# Patient Record
Sex: MALE | Race: WHITE | Employment: UNEMPLOYED | URBAN - METROPOLITAN AREA
[De-identification: names, ages, dates, MRNs, and addresses within clinical notes are randomized per-mention and may not be internally consistent; named-entity substitution may affect disease eponyms.]

---

## 2017-01-09 RX ORDER — AZATHIOPRINE 50 MG/1
TABLET ORAL
Qty: 150 TABLET | Refills: 5 | Status: SHIPPED | OUTPATIENT
Start: 2017-01-09 | End: 2017-06-29

## 2017-01-09 RX ORDER — AZATHIOPRINE 50 MG/1
TABLET ORAL
Qty: 150 TABLET | Refills: 5 | OUTPATIENT
Start: 2017-01-09

## 2017-01-09 NOTE — TELEPHONE ENCOUNTER
From: Thomas Courser  To:  Austin Crum MD  Sent: 1/6/2017 7:35 PM CST  Subject: Medication Renewal Request    Original authorizing provider: MD Thomas Flood Courser would like a refill of the following medications:  azathioprine

## 2017-01-09 NOTE — TELEPHONE ENCOUNTER
Pending Prescriptions Disp Refills    AZATHIOPRINE 48 MG Oral Tab [Pharmacy Med Name: AZATHIOPRINE 50 MG TABLET] 150 tablet 5     Sig: TAKE FIVE TABLETS BY MOUTH DAILY         See refill request  Patient last seen 6-20-16.    Medication last refilled 6-20

## 2017-01-09 NOTE — TELEPHONE ENCOUNTER
Pending Prescriptions Disp Refills    azathioprine 50 MG Oral Tab 150 tablet 5     Sig: TAKE FIVE TABLETS BY MOUTH DAILY         See refill request  Patient last seen 6-20-16.    Medication last refilled 6-20-16

## 2017-03-22 LAB
ABSOLUTE BASOPHILS: 35 CELLS/UL (ref 0–200)
ABSOLUTE EOSINOPHILS: 66 CELLS/UL (ref 15–500)
ABSOLUTE LYMPHOCYTES: 774 CELLS/UL (ref 850–3900)
ABSOLUTE MONOCYTES: 260 CELLS/UL (ref 200–950)
ABSOLUTE NEUTROPHILS: 3265 CELLS/UL (ref 1500–7800)
ALBUMIN/GLOBULIN RATIO: 1.3 (CALC) (ref 1–2.5)
ALBUMIN: 4.3 G/DL (ref 3.6–5.1)
ALKALINE PHOSPHATASE: 52 U/L (ref 40–115)
ALT: 15 U/L (ref 9–46)
AST: 19 U/L (ref 10–35)
BASOPHILS: 0.8 %
BILIRUBIN, DIRECT: 0.1 MG/DL
BILIRUBIN, INDIRECT: 0.5 MG/DL (CALC) (ref 0.2–1.2)
BILIRUBIN, TOTAL: 0.6 MG/DL (ref 0.2–1.2)
EOSINOPHILS: 1.5 %
GLOBULIN: 3.2 G/DL (CALC) (ref 1.9–3.7)
HEMATOCRIT: 45.6 % (ref 38.5–50)
HEMOGLOBIN: 15.3 G/DL (ref 13.2–17.1)
LYMPHOCYTES: 17.6 %
MCH: 30 PG (ref 27–33)
MCHC: 33.5 G/DL (ref 32–36)
MCV: 89.6 FL (ref 80–100)
MONOCYTES: 5.9 %
MPV: 8.9 FL (ref 7.5–12.5)
NEUTROPHILS: 74.2 %
PLATELET COUNT: 196 THOUSAND/UL (ref 140–400)
PROTEIN, TOTAL: 7.5 G/DL (ref 6.1–8.1)
RDW: 14 % (ref 11–15)
RED BLOOD CELL COUNT: 5.09 MILLION/UL (ref 4.2–5.8)
WHITE BLOOD CELL COUNT: 4.4 THOUSAND/UL (ref 3.8–10.8)

## 2017-03-24 ENCOUNTER — OFFICE VISIT (OUTPATIENT)
Dept: DERMATOLOGY CLINIC | Facility: CLINIC | Age: 52
End: 2017-03-24

## 2017-03-24 DIAGNOSIS — D22.9 MULTIPLE NEVI: ICD-10-CM

## 2017-03-24 DIAGNOSIS — D23.4 BENIGN NEOPLASM OF SCALP AND SKIN OF NECK: ICD-10-CM

## 2017-03-24 DIAGNOSIS — D23.5 BENIGN NEOPLASM OF SKIN OF TRUNK, EXCEPT SCROTUM: ICD-10-CM

## 2017-03-24 DIAGNOSIS — D23.60 BENIGN NEOPLASM OF SKIN OF UPPER LIMB, INCLUDING SHOULDER, UNSPECIFIED LATERALITY: ICD-10-CM

## 2017-03-24 DIAGNOSIS — L82.1 SEBORRHEIC KERATOSES: ICD-10-CM

## 2017-03-24 DIAGNOSIS — L72.0 EPIDERMAL CYST: Primary | ICD-10-CM

## 2017-03-24 PROCEDURE — 99201 OFFICE/OUTPT VISIT,NEW,LEVL I: CPT | Performed by: DERMATOLOGY

## 2017-03-24 PROCEDURE — 99212 OFFICE O/P EST SF 10 MIN: CPT | Performed by: DERMATOLOGY

## 2017-04-10 NOTE — PROGRESS NOTES
Herman Garner is a 45year old male. HPI:     CC:  Patient presents with:  Full Skin Exam: New pt presenting for full body skin exam. Pt is currerntly taking azathioprine for treatment of crohn's disease. Pt denies personal and family hx of skin cancer. flexure   • Status post small bowel resection 2013   • Status post cystoscopy    • History of kidney surgery      per NG (ECD) : \"kidney stent removal\"   • Lipid screening 12-         Past Surgical History    COLECTOMY  2013    APPENDECTOMY oriented in no acute distress. Exam total-body performed, including scalp, head, neck, face,nails, hair, external eyes, including conjunctival mucosa, eyelids, lips external ears, back, chest,/ breasts, axillae,  abdomen, arms, legs, palms.      Multiple l of melanoma discussed with patient. Sunscreen use, sun protection, self exams reviewed. Followup as noted RTC routine checkup 6 mos - one year or p.r.n.

## 2017-06-30 RX ORDER — AZATHIOPRINE 50 MG/1
TABLET ORAL
Qty: 150 TABLET | Refills: 5 | Status: SHIPPED
Start: 2017-06-30 | End: 2017-12-15

## 2017-06-30 NOTE — TELEPHONE ENCOUNTER
Last seen 6/20/16, last refilled 1/9/17.  Please advise    Future Appointments  Date Time Provider Ketan Cervantes   7/26/2017 2:00 PM Tyron Gerard MD [facility] Narda HEBERT

## 2017-06-30 NOTE — TELEPHONE ENCOUNTER
From: Shen Mckenna  Sent: 6/29/2017 10:50 AM CDT  Subject: Medication Renewal Request    Shen Mckenna would like a refill of the following medications:  azathioprine 50 MG Oral Tab Claus Chisholm MD]    Preferred pharmacy: [facility] [id number] [facility] [employer] -

## 2017-07-18 ENCOUNTER — PATIENT MESSAGE (OUTPATIENT)
Dept: GASTROENTEROLOGY | Facility: CLINIC | Age: 52
End: 2017-07-18

## 2017-07-18 DIAGNOSIS — K50.00 CROHN'S DISEASE OF SMALL INTESTINE WITHOUT COMPLICATION (HCC): Primary | ICD-10-CM

## 2017-07-18 NOTE — TELEPHONE ENCOUNTER
From: Shen Mckenna  To: Alverto Negro MD  Sent: 7/18/2017 2:17 PM CDT  Subject: Non-Urgent Medical Question    hello,i need to have my bloodwork order updated. [facility] in [place].  would like to get my blood checked by weeks e

## 2017-07-21 LAB
ABSOLUTE BASOPHILS: 28 CELLS/UL (ref 0–200)
ABSOLUTE EOSINOPHILS: 129 CELLS/UL (ref 15–500)
ABSOLUTE LYMPHOCYTES: 918 CELLS/UL (ref 850–3900)
ABSOLUTE MONOCYTES: 521 CELLS/UL (ref 200–950)
ABSOLUTE NEUTROPHILS: 4004 CELLS/UL (ref 1500–7800)
ALBUMIN/GLOBULIN RATIO: 1.5 (CALC) (ref 1–2.5)
ALBUMIN: 4.4 G/DL (ref 3.6–5.1)
ALKALINE PHOSPHATASE: 48 U/L (ref 40–115)
ALT: 24 U/L (ref 9–46)
AST: 28 U/L (ref 10–35)
BASOPHILS: 0.5 %
BILIRUBIN, TOTAL: 0.8 MG/DL (ref 0.2–1.2)
BUN: 12 MG/DL (ref 7–25)
CALCIUM: 9.6 MG/DL (ref 8.6–10.3)
CARBON DIOXIDE: 31 MMOL/L (ref 20–31)
CHLORIDE: 102 MMOL/L (ref 98–110)
CREATININE: 1.22 MG/DL (ref 0.7–1.33)
EGFR IF AFRICN AM: 79 ML/MIN/1.73M2
EGFR IF NONAFRICN AM: 68 ML/MIN/1.73M2
EOSINOPHILS: 2.3 %
GLOBULIN: 3 G/DL (CALC) (ref 1.9–3.7)
GLUCOSE: 105 MG/DL (ref 65–99)
HEMATOCRIT: 44.6 % (ref 38.5–50)
HEMOGLOBIN: 15.6 G/DL (ref 13.2–17.1)
LYMPHOCYTES: 16.4 %
MCH: 30.6 PG (ref 27–33)
MCHC: 35 G/DL (ref 32–36)
MCV: 87.6 FL (ref 80–100)
MONOCYTES: 9.3 %
MPV: 11.1 FL (ref 7.5–12.5)
NEUTROPHILS: 71.5 %
PLATELET COUNT: 170 THOUSAND/UL (ref 140–400)
POTASSIUM: 3.6 MMOL/L (ref 3.5–5.3)
PROTEIN, TOTAL: 7.4 G/DL (ref 6.1–8.1)
RDW: 15.9 % (ref 11–15)
RED BLOOD CELL COUNT: 5.09 MILLION/UL (ref 4.2–5.8)
SODIUM: 140 MMOL/L (ref 135–146)
WHITE BLOOD CELL COUNT: 5.6 THOUSAND/UL (ref 3.8–10.8)

## 2017-07-26 ENCOUNTER — OFFICE VISIT (OUTPATIENT)
Dept: GASTROENTEROLOGY | Facility: CLINIC | Age: 52
End: 2017-07-26

## 2017-07-26 VITALS
SYSTOLIC BLOOD PRESSURE: 121 MMHG | HEIGHT: 72.75 IN | DIASTOLIC BLOOD PRESSURE: 77 MMHG | HEART RATE: 71 BPM | WEIGHT: 256.19 LBS | BODY MASS INDEX: 33.95 KG/M2

## 2017-07-26 DIAGNOSIS — K50.80 CROHN'S DISEASE OF BOTH SMALL AND LARGE INTESTINE WITHOUT COMPLICATION (HCC): Primary | ICD-10-CM

## 2017-07-26 PROCEDURE — 99212 OFFICE O/P EST SF 10 MIN: CPT | Performed by: INTERNAL MEDICINE

## 2017-07-26 PROCEDURE — 99213 OFFICE O/P EST LOW 20 MIN: CPT | Performed by: INTERNAL MEDICINE

## 2017-07-26 NOTE — PROGRESS NOTES
HPI:    Patient ID: Edgardo Romero is a 45year old male. HPI  Mohan Cotto returns in follow-up.  He was last seen in June 2016.  As per previous notes he has a history of Crohn's disease dating back approximately 17 years (2000).  He developed a psoas abscess stools. He has noted no bleeding. The patient denies fevers, chills or sweats. Sveta Jones is currently taking 200 mg daily of azathioprine. Laboratory testing has been favorable with a therapeutic 6-TG level.     Sevta Jones has also followed up with dermato note and vitals reviewed.       Component      Latest Ref Rng & Units 7/20/2017   WHITE BLOOD CELL COUNT      3.8 - 10.8 Thousand/uL 5.6   RED BLOOD CELL COUNT      4.20 - 5.80 Million/uL 5.09   Hemoglobin      13.2 - 17.1 g/dL 15.6   Hematocrit      38.5 - acceptable. I am recommending that we continue current therapy. The patient is not interested in escalating therapy hence I would not proceed with a colonoscopy at this juncture. The patient will continue laboratory testing every 3 months.   Yearly derma

## 2017-07-27 NOTE — PATIENT INSTRUCTIONS
1.  Continue current medication. 2.  Blood work every 3 months. 3.  Please see the dermatologist if the spot on your nose does not go away within a few weeks. 4.  Make an appointment with the eye doctor.   5.  Follow-up office visit in 1 year or sooner i

## 2017-08-31 RX ORDER — LISINOPRIL AND HYDROCHLOROTHIAZIDE 25; 20 MG/1; MG/1
1 TABLET ORAL
Qty: 90 TABLET | Refills: 3 | OUTPATIENT
Start: 2017-08-31

## 2017-09-01 RX ORDER — LISINOPRIL AND HYDROCHLOROTHIAZIDE 25; 20 MG/1; MG/1
1 TABLET ORAL
Qty: 90 TABLET | Refills: 0 | Status: SHIPPED | OUTPATIENT
Start: 2017-09-01 | End: 2017-12-26

## 2017-09-01 NOTE — TELEPHONE ENCOUNTER
Failed protocol not seen in over 1 year.    Hypertensive Medications  Protocol Criteria:  · Appointment scheduled in the past 6 months or in the next 3 months  · BMP or CMP in the past 12 months  · Creatinine result < 2  Recent Outpatient Visits

## 2017-09-01 NOTE — TELEPHONE ENCOUNTER
Failed protocol not seen in 1 year.      Hypertensive Medications  Protocol Criteria:  · Appointment scheduled in the past 6 months or in the next 3 months  · BMP or CMP in the past 12 months  · Creatinine result < 2  Recent Outpatient Visits            1 m

## 2017-09-01 NOTE — TELEPHONE ENCOUNTER
Message noted: Chart reviewed and may refill medication as requested times one. Prescription sent to listed pharmacy.  Please notify patient to make follow up appointment for further refills

## 2017-12-15 RX ORDER — AZATHIOPRINE 50 MG/1
TABLET ORAL
Qty: 150 TABLET | Refills: 5 | Status: SHIPPED | OUTPATIENT
Start: 2017-12-15 | End: 2018-06-18

## 2017-12-15 NOTE — TELEPHONE ENCOUNTER
Pending Prescriptions Disp Refills    AZATHIOPRINE 50 MG Oral Tab [Pharmacy Med Name: AZATHIOPRINE 50 MG TABLET] 150 tablet 5     Sig: TAKE FIVE TABLETS BY MOUTH DAILY         LOV 7/26/17  LR  6/30/17    em

## 2017-12-26 ENCOUNTER — TELEPHONE (OUTPATIENT)
Dept: INTERNAL MEDICINE CLINIC | Facility: CLINIC | Age: 52
End: 2017-12-26

## 2017-12-26 RX ORDER — LISINOPRIL AND HYDROCHLOROTHIAZIDE 25; 20 MG/1; MG/1
TABLET ORAL
Qty: 30 TABLET | Refills: 0 | Status: SHIPPED | OUTPATIENT
Start: 2017-12-26 | End: 2018-01-02

## 2017-12-26 NOTE — TELEPHONE ENCOUNTER
Refilled per protocol for #30 until appointment on 1/2/18 with Dr. Barbara Boone      Hypertensive Medications  Protocol Criteria:  · Appointment scheduled in the past 6 months or in the next 3 months  · BMP or CMP in the past 12 months  · Creatinine result < 2  R

## 2017-12-26 NOTE — TELEPHONE ENCOUNTER
Pt contacted to obtain insurance info for his appt on 1/2/2018 with Dr. Lynda Gay, but he does not have any. Pt coming in as self pay.   Pt asking since he does have a scheduled appt to come in on 1/2, he is asking if he can get a refill on medication below to

## 2017-12-27 RX ORDER — LISINOPRIL AND HYDROCHLOROTHIAZIDE 25; 20 MG/1; MG/1
TABLET ORAL
Qty: 90 TABLET | Refills: 0 | Status: SHIPPED | OUTPATIENT
Start: 2017-12-27 | End: 2018-01-02

## 2017-12-27 NOTE — TELEPHONE ENCOUNTER
Message noted: Chart reviewed and may refill medication as requested times one. Prescription sent to listed pharmacy. Please notify patient to make follow up appointment for further refills. Former pt of Dr Saurabh Foley.

## 2018-01-02 ENCOUNTER — OFFICE VISIT (OUTPATIENT)
Dept: INTERNAL MEDICINE CLINIC | Facility: CLINIC | Age: 53
End: 2018-01-02

## 2018-01-02 VITALS
TEMPERATURE: 97 F | BODY MASS INDEX: 36.16 KG/M2 | HEIGHT: 72 IN | HEART RATE: 68 BPM | SYSTOLIC BLOOD PRESSURE: 117 MMHG | DIASTOLIC BLOOD PRESSURE: 74 MMHG | WEIGHT: 267 LBS

## 2018-01-02 DIAGNOSIS — I10 ESSENTIAL HYPERTENSION: Primary | ICD-10-CM

## 2018-01-02 PROCEDURE — 99212 OFFICE O/P EST SF 10 MIN: CPT | Performed by: INTERNAL MEDICINE

## 2018-01-02 PROCEDURE — 99203 OFFICE O/P NEW LOW 30 MIN: CPT | Performed by: INTERNAL MEDICINE

## 2018-01-02 RX ORDER — LISINOPRIL AND HYDROCHLOROTHIAZIDE 25; 20 MG/1; MG/1
1 TABLET ORAL
Qty: 90 TABLET | Refills: 1 | Status: SHIPPED | OUTPATIENT
Start: 2018-01-02 | End: 2018-09-14

## 2018-01-02 NOTE — PROGRESS NOTES
Patient ID: Tony Villarreal is a 47year old male. Patient presents with:  Establish Care  Medication Request       HISTORY OF PRESENT ILLNESS:   HPI  Patient presents for above. Here to establish care after prior physician retired.   History of hypertens Single  Spouse name: N/A    Years of education: N/A  Number of children: N/A     Occupational History  None on file     Social History Main Topics   Smoking status: Former Smoker     Quit date: 8/21/2000    Smokeless tobacco: Former User    Comment: Vapor

## 2018-01-02 NOTE — PATIENT INSTRUCTIONS
Taking Your Blood Pressure  Blood pressure is the force of blood against the artery wall as it moves from the heart through the blood vessels. You can take your own blood pressure reading using a digital monitor.  Take your readings the same each time, us · Place your arm on the table, palm up. Your arm should be at the level of your heart. Wrap the cuff around your upper arm, just above your elbow. It’s best done on bare skin, not over clothing.  Most cuffs will indicate where the brachial artery (the blood

## 2018-03-09 LAB
ABSOLUTE BASOPHILS: 41 CELLS/UL (ref 0–200)
ABSOLUTE EOSINOPHILS: 53 CELLS/UL (ref 15–500)
ABSOLUTE LYMPHOCYTES: 867 CELLS/UL (ref 850–3900)
ABSOLUTE MONOCYTES: 472 CELLS/UL (ref 200–950)
ABSOLUTE NEUTROPHILS: 4466 CELLS/UL (ref 1500–7800)
ALBUMIN/GLOBULIN RATIO: 1.6 (CALC) (ref 1–2.5)
ALBUMIN: 4.6 G/DL (ref 3.6–5.1)
ALKALINE PHOSPHATASE: 50 U/L (ref 40–115)
ALT: 34 U/L (ref 9–46)
AST: 46 U/L (ref 10–35)
BASOPHILS: 0.7 %
BILIRUBIN, DIRECT: 0.1 MG/DL
BILIRUBIN, INDIRECT: 0.6 MG/DL (CALC) (ref 0.2–1.2)
BILIRUBIN, TOTAL: 0.7 MG/DL (ref 0.2–1.2)
EOSINOPHILS: 0.9 %
GLOBULIN: 2.9 G/DL (CALC) (ref 1.9–3.7)
HEMATOCRIT: 48.5 % (ref 38.5–50)
HEMOGLOBIN: 16.4 G/DL (ref 13.2–17.1)
LYMPHOCYTES: 14.7 %
MCH: 29.8 PG (ref 27–33)
MCHC: 33.8 G/DL (ref 32–36)
MCV: 88 FL (ref 80–100)
MONOCYTES: 8 %
MPV: 11.4 FL (ref 7.5–12.5)
NEUTROPHILS: 75.7 %
PLATELET COUNT: 185 THOUSAND/UL (ref 140–400)
PROTEIN, TOTAL: 7.5 G/DL (ref 6.1–8.1)
RDW: 14.8 % (ref 11–15)
RED BLOOD CELL COUNT: 5.51 MILLION/UL (ref 4.2–5.8)
WHITE BLOOD CELL COUNT: 5.9 THOUSAND/UL (ref 3.8–10.8)

## 2018-04-20 ENCOUNTER — OFFICE VISIT (OUTPATIENT)
Dept: INTERNAL MEDICINE CLINIC | Facility: CLINIC | Age: 53
End: 2018-04-20

## 2018-04-20 VITALS
BODY MASS INDEX: 35.89 KG/M2 | HEART RATE: 61 BPM | DIASTOLIC BLOOD PRESSURE: 73 MMHG | TEMPERATURE: 98 F | WEIGHT: 265 LBS | HEIGHT: 72 IN | SYSTOLIC BLOOD PRESSURE: 117 MMHG

## 2018-04-20 DIAGNOSIS — Z00.00 ANNUAL PHYSICAL EXAM: Primary | ICD-10-CM

## 2018-04-20 DIAGNOSIS — K50.80 CROHN'S DISEASE OF BOTH SMALL AND LARGE INTESTINE WITHOUT COMPLICATION (HCC): ICD-10-CM

## 2018-04-20 DIAGNOSIS — I10 ESSENTIAL HYPERTENSION: ICD-10-CM

## 2018-04-20 DIAGNOSIS — E66.9 OBESITY (BMI 30.0-34.9): ICD-10-CM

## 2018-04-20 PROCEDURE — 99396 PREV VISIT EST AGE 40-64: CPT | Performed by: INTERNAL MEDICINE

## 2018-04-20 NOTE — PATIENT INSTRUCTIONS
Prevention Guidelines, Men Ages 48 to 59  Screening tests and vaccines are an important part of managing your health. Health counseling is essential, too. Below are guidelines for these, for men ages 48 to 59.  Talk with your healthcare provider to make s Prostate cancer Starting at age 39, talk to healthcare provider about risks and benefits of digital rectal exam (BLAKE) and prostate-specific antigen (PSA) screening1 At routine exams   Syphilis Men at increased risk for infection – talk with your healthcare pertussis (Td/Tdap) booster All men in this age group Td every 10 years, or a one-time dose of Tdap instead of a Td booster after age 25, then Td every 8 years   Zoster All men ages 61 and older 1 dose   Counseling Who needs it How often   Diet and exerci Foods did not cause your Crohn's, but they can affect it. Unfortunately, there is no one diet that works for everyone. Below are some things to try. Keep a food log to figure out what you are sensitive to.   · Eat more slowly and smaller amounts at a time, · Consider nutritional supplements. This is especially true if the diarrhea is prolonged, or you aren't eating or are losing weight.   Follow-up care  Follow up with your healthcare provider, or as advised. If a stool sample was taken or cultures were done,

## 2018-04-21 NOTE — PROGRESS NOTES
Patient ID: Doris Ugarte is a 47year old male. Patient presents with:  Physical       HISTORY OF PRESENT ILLNESS:   HPI  Patient presents for above. Here for annual exam.  History of hypertension on medications. Well-controlled.   Denies any chest pa Tab, TAKE FIVE TABLETS BY MOUTH DAILY, Disp: 150 tablet, Rfl: 5    Allergies:  Penicillin G            Rash      Social History  Social History   Marital status: Single  Spouse name: N/A    Years of education: N/A  Number of children: N/A     Occupational exam  · CBC WITH DIFFERENTIAL WITH PLATELET  · COMP METABOLIC PANEL (14)  · LIPID PANEL  · TSH W REFLEX TO FREE T4  · PSA    2. Essential hypertension  · MICROALB/CREAT RATIO, RANDOM URINE  · Continue current therapy as blood pressure well controlled.     3

## 2018-06-05 ENCOUNTER — TELEPHONE (OUTPATIENT)
Dept: INTERNAL MEDICINE CLINIC | Facility: CLINIC | Age: 53
End: 2018-06-05

## 2018-06-18 ENCOUNTER — OFFICE VISIT (OUTPATIENT)
Dept: GASTROENTEROLOGY | Facility: CLINIC | Age: 53
End: 2018-06-18

## 2018-06-18 VITALS
HEIGHT: 72 IN | SYSTOLIC BLOOD PRESSURE: 127 MMHG | DIASTOLIC BLOOD PRESSURE: 76 MMHG | WEIGHT: 262.19 LBS | HEART RATE: 72 BPM | BODY MASS INDEX: 35.51 KG/M2

## 2018-06-18 DIAGNOSIS — K50.80 CROHN'S DISEASE OF BOTH SMALL AND LARGE INTESTINE WITHOUT COMPLICATION (HCC): Primary | ICD-10-CM

## 2018-06-18 PROCEDURE — 99212 OFFICE O/P EST SF 10 MIN: CPT | Performed by: INTERNAL MEDICINE

## 2018-06-18 PROCEDURE — 99213 OFFICE O/P EST LOW 20 MIN: CPT | Performed by: INTERNAL MEDICINE

## 2018-06-18 RX ORDER — AZATHIOPRINE 50 MG/1
250 TABLET ORAL DAILY
Qty: 450 TABLET | Refills: 1 | Status: SHIPPED | OUTPATIENT
Start: 2018-06-18 | End: 2018-12-10

## 2018-06-18 NOTE — PROGRESS NOTES
HPI:    Patient ID: Татьяна Naqvi is a 47year old male. HPI  Guyann Prader returns in follow-up.  He was last seen in July 2017.      As per previous notes he has a history of Crohn's disease dating back approximately 18 years (2000).  He developed a psoas abs loose stools and on a bad day he will have 5 stools. He has noted no bleeding. The patient denies fevers, chills, cough or shortness of breath. He is taking 250 mg daily of azathioprine and requests a refill.      He last saw Dr. Perfecto Beckman in March 20 BLOOD CELL COUNT      4.20 - 5.80 Million/uL 5.34   Hemoglobin      13.2 - 17.1 g/dL 16.1   Hematocrit      38.5 - 50.0 % 46.9   MCV      80.0 - 100.0 fL 87.8   MCH      27.0 - 33.0 pg 30.1   MCHC      32.0 - 36.0 g/dL 34.3   RDW      11.0 - 15.0 % 14.6 obtain laboratory testing every 3 months. He should see Dr. Eli Melendez at least yearly. He will make an appointment to do so. Sveta Jones is due for a screening colonoscopy next year and was apprised of such. He will contact me with any changes.       Meds This V

## 2018-06-18 NOTE — PATIENT INSTRUCTIONS
1.  Continue current dose of azathioprine. 2.  Please make an appointment to see Dr. Kareem Ceballos from dermatology. 3.  Continue to obtain blood work every 3 months. 4.  You are due for colonoscopy next year. 5.  Please contact me sooner with any changes.

## 2018-09-14 DIAGNOSIS — I10 ESSENTIAL HYPERTENSION: ICD-10-CM

## 2018-09-14 RX ORDER — LISINOPRIL AND HYDROCHLOROTHIAZIDE 25; 20 MG/1; MG/1
TABLET ORAL
Qty: 90 TABLET | Refills: 0 | OUTPATIENT
Start: 2018-09-14

## 2018-09-14 RX ORDER — LISINOPRIL AND HYDROCHLOROTHIAZIDE 25; 20 MG/1; MG/1
1 TABLET ORAL
Qty: 90 TABLET | Refills: 1 | Status: SHIPPED | OUTPATIENT
Start: 2018-09-14 | End: 2019-03-01

## 2018-09-14 RX ORDER — LISINOPRIL AND HYDROCHLOROTHIAZIDE 25; 20 MG/1; MG/1
TABLET ORAL
Qty: 30 TABLET | Refills: 0 | OUTPATIENT
Start: 2018-09-14

## 2018-09-27 ENCOUNTER — TELEPHONE (OUTPATIENT)
Dept: GASTROENTEROLOGY | Facility: CLINIC | Age: 53
End: 2018-09-27

## 2018-09-27 NOTE — TELEPHONE ENCOUNTER
Edelmira/Kenneth Diag calling for mutual pt, indicates pt is there now and asking if [facility] lab standing orders can be faxed asap at:Fax#[phone] / Attn: Edelmira/Kenneth, thanks.   *Pt there now

## 2018-09-27 NOTE — TELEPHONE ENCOUNTER
Printed orders and faxed to [facility]#[phone] / Ilana Levine: Edelmira/Kenneth.  Fax confirmed

## 2018-09-28 LAB
ABSOLUTE BASOPHILS: 41 CELLS/UL (ref 0–200)
ABSOLUTE EOSINOPHILS: 82 CELLS/UL (ref 15–500)
ABSOLUTE LYMPHOCYTES: 904 CELLS/UL (ref 850–3900)
ABSOLUTE MONOCYTES: 462 CELLS/UL (ref 200–950)
ABSOLUTE NEUTROPHILS: 5311 CELLS/UL (ref 1500–7800)
ALBUMIN/GLOBULIN RATIO: 1.4 (CALC) (ref 1–2.5)
ALBUMIN: 4.3 G/DL (ref 3.6–5.1)
ALKALINE PHOSPHATASE: 52 U/L (ref 40–115)
ALT: 26 U/L (ref 9–46)
AST: 31 U/L (ref 10–35)
BASOPHILS: 0.6 %
BILIRUBIN, DIRECT: 0.1 MG/DL
BILIRUBIN, INDIRECT: 0.7 MG/DL (CALC) (ref 0.2–1.2)
BILIRUBIN, TOTAL: 0.8 MG/DL (ref 0.2–1.2)
EOSINOPHILS: 1.2 %
GLOBULIN: 3 G/DL (CALC) (ref 1.9–3.7)
HEMATOCRIT: 47.4 % (ref 38.5–50)
HEMOGLOBIN: 16.6 G/DL (ref 13.2–17.1)
LYMPHOCYTES: 13.3 %
MCH: 30.6 PG (ref 27–33)
MCHC: 35 G/DL (ref 32–36)
MCV: 87.3 FL (ref 80–100)
MONOCYTES: 6.8 %
MPV: 11 FL (ref 7.5–12.5)
NEUTROPHILS: 78.1 %
PLATELET COUNT: 155 THOUSAND/UL (ref 140–400)
PROTEIN, TOTAL: 7.3 G/DL (ref 6.1–8.1)
RDW: 14.7 % (ref 11–15)
RED BLOOD CELL COUNT: 5.43 MILLION/UL (ref 4.2–5.8)
WHITE BLOOD CELL COUNT: 6.8 THOUSAND/UL (ref 3.8–10.8)

## 2018-12-06 ENCOUNTER — PATIENT MESSAGE (OUTPATIENT)
Dept: GASTROENTEROLOGY | Facility: CLINIC | Age: 53
End: 2018-12-06

## 2018-12-07 NOTE — TELEPHONE ENCOUNTER
LOV 06/18/18, labs in September. Reminder message sent to pt to have 3 month blood draw this month.   Please advise on refill request.

## 2018-12-07 NOTE — TELEPHONE ENCOUNTER
From: Abbie Beasley  To: Karen Fitzpatrick MD  Sent: 12/6/2018 4:26 PM CST  Subject: Prescription Question    hello,i will need my perscription renewed for my chrohns pills. im still at [facility] in [place] by norma acldwell and [facility] javi. thanks

## 2018-12-10 RX ORDER — AZATHIOPRINE 50 MG/1
250 TABLET ORAL DAILY
Qty: 450 TABLET | Refills: 1 | Status: SHIPPED | OUTPATIENT
Start: 2018-12-10 | End: 2019-06-01

## 2018-12-10 NOTE — TELEPHONE ENCOUNTER
Pt returned call, relayed [facility] APRN message as shown below. Pt states is doing well and asymptomatic, but is requesting to be seen by Dr. Claudell Public and not by APRN.  Informed sooner appt available with Dayne Baltazar, but pt continued to want appt with

## 2018-12-10 NOTE — PROGRESS NOTES
Nursing: Prescription for azathioprine sent to pharmacy as Dr. Maki Suarez indicated refills x 1 year at the last office visit. Given that it has been 6 months since his last office visit I would recommend a routine office follow-up.   I concur with recom

## 2018-12-10 NOTE — TELEPHONE ENCOUNTER
[facility] [employer] [facility]. Jazlyn Duarte, APRN 1 hour ago (8:33 AM)        Nursing: Prescription for azathioprine sent to pharmacy as Dr. Susan Zhang indicated refills x 1 year at the last office visit.   Given that it has been 6 mon

## 2019-01-24 ENCOUNTER — PATIENT MESSAGE (OUTPATIENT)
Dept: GASTROENTEROLOGY | Facility: CLINIC | Age: 54
End: 2019-01-24

## 2019-01-25 NOTE — TELEPHONE ENCOUNTER
From: Cande Reyes  To: Ry An MD  Sent: 1/24/2019 6:41 PM CST  Subject: Non-Urgent Medical Question    hello dr Azam Manjarrez. i was wondering if it is neccessary for me to come in on 2-4 2019. Sujatha Spar Sujatha Spar Sujatha Spar i was told by a caller from the clinic it w

## 2019-01-27 NOTE — TELEPHONE ENCOUNTER
Please inform the patient that if he is feeling well, he may cancel his office appointment this February and plan/schedule a colonoscopy in June/August with a split dose SuPrep or Trilyte preparation and with IV sedation or MAC per scheduling.   Diagnosis:

## 2019-01-28 ENCOUNTER — TELEPHONE (OUTPATIENT)
Dept: GASTROENTEROLOGY | Facility: CLINIC | Age: 54
End: 2019-01-28

## 2019-01-28 DIAGNOSIS — K50.919 CROHN'S DISEASE WITH COMPLICATION, UNSPECIFIED GASTROINTESTINAL TRACT LOCATION (HCC): Primary | ICD-10-CM

## 2019-01-28 NOTE — TELEPHONE ENCOUNTER
Routed to GI Schedulers  GI schedulers- please schedule patient.  -------    Patient states he is feeling well and wants to schedule CLN and cancel OV appt. OV appt canceled and routed to schedulers.

## 2019-01-28 NOTE — TELEPHONE ENCOUNTER
Abril Campa MD 23 hours ago (3:59 PM)         Please inform the patient that if he is feeling well, he may cancel his office appointment this February and plan/schedule a colonoscopy in June/August with a split dose SuPrep or Trilyte preparation

## 2019-01-31 NOTE — TELEPHONE ENCOUNTER
Scheduled for:  Colonoscopy [address]  Provider Name: Dr. Merissa Jackson  Date:  7/23/19  Location:  [facility]  Sedation:  MAC  Time:   0730 (pt is aware to arrive at [address])   Prep:  Suprep, mailed 2/1/19  Meds/Allergies Reconciled?:  Physician reviewed   Diagnosis with codes:

## 2019-03-01 ENCOUNTER — OFFICE VISIT (OUTPATIENT)
Dept: INTERNAL MEDICINE CLINIC | Facility: CLINIC | Age: 54
End: 2019-03-01

## 2019-03-01 VITALS
RESPIRATION RATE: 22 BRPM | DIASTOLIC BLOOD PRESSURE: 82 MMHG | WEIGHT: 270 LBS | HEART RATE: 68 BPM | HEIGHT: 72 IN | BODY MASS INDEX: 36.57 KG/M2 | OXYGEN SATURATION: 99 % | TEMPERATURE: 98 F | SYSTOLIC BLOOD PRESSURE: 134 MMHG

## 2019-03-01 DIAGNOSIS — I10 ESSENTIAL HYPERTENSION: Primary | ICD-10-CM

## 2019-03-01 DIAGNOSIS — K50.10 CROHN'S DISEASE OF COLON WITHOUT COMPLICATION (HCC): ICD-10-CM

## 2019-03-01 DIAGNOSIS — E78.5 HYPERLIPIDEMIA LDL GOAL <100: ICD-10-CM

## 2019-03-01 DIAGNOSIS — E66.09 CLASS 2 OBESITY DUE TO EXCESS CALORIES WITHOUT SERIOUS COMORBIDITY WITH BODY MASS INDEX (BMI) OF 36.0 TO 36.9 IN ADULT: ICD-10-CM

## 2019-03-01 PROCEDURE — 99204 OFFICE O/P NEW MOD 45 MIN: CPT | Performed by: INTERNAL MEDICINE

## 2019-03-01 RX ORDER — LISINOPRIL AND HYDROCHLOROTHIAZIDE 25; 20 MG/1; MG/1
1 TABLET ORAL
Qty: 90 TABLET | Refills: 3 | Status: SHIPPED | OUTPATIENT
Start: 2019-03-01 | End: 2020-03-12

## 2019-03-01 NOTE — PROGRESS NOTES
HPI:    Patient ID: Abhinav Rios is a 46year old male. HPI   Patient is her to establish care . Chronic HTN  well controlled until  3 mos ago, BP was elevated to 180/95 per home monitor. Denies chest pain,, dizzy spells, NEVAREZ. He had gained 8 lbs.  Mi PREP KIT) 17.5-3.13-1.6 GM/177ML Oral Solution Take as directed. May use Lilian Scott or any other generic/subsitute leticia Disp: 1 Bottle Rfl: 0   azathioprine 50 MG Oral Tab Take 5 tablets (250 mg total) by mouth daily.  Disp: 450 tablet Rfl: 1     All without complication (HCC)  Azathioprine 250 mgs daily  Colonoscopy scheduled . Merary Gerard following.        Orders Placed This Encounter      LIPID PANEL [2320] [Q]      Meds This Visit:  Requested Prescriptions     Signed Prescriptions Disp Refills   •

## 2019-03-02 NOTE — PATIENT INSTRUCTIONS
Low-Salt Choices  Eating salt (sodium) can make your body retain too much water. Excess water makes your heart work harder. Canned, packaged, and frozen foods are easy to prepare. But they are often high in sodium.  Here are some ideas for low-salt foods Have you been told that your cholesterol is too high? If so, you could be heading for a heart attack, also known as acute myocardial infarction (AMI), or stroke. This is especially true if you have other risk factors for heart disease.  Get smart about chol · Do you exercise very little or not very often? Recommendations are for 30 minutes of exercise at least 5 days a week.  If you are not doing cardiovascular exercise as often as these recommendations, it may not be enough and you may be at higher risk for e If you have high cholesterol, you may need your cholesterol level tested more often to make sure your medicine and lifestyle changes are working to reduce your risks of having a heart attack or stroke.    Date Last Reviewed: 6/1/2016  © 3486-7537 [employer]

## 2019-03-19 LAB
CHOL/HDLC RATIO: 4.2 (CALC)
CHOLESTEROL, TOTAL: 133 MG/DL
HDL CHOLESTEROL: 32 MG/DL
LDL-CHOLESTEROL: 70 MG/DL (CALC)
NON-HDL CHOLESTEROL: 101 MG/DL (CALC)
TRIGLYCERIDES: 222 MG/DL

## 2019-03-20 LAB
ABSOLUTE BASOPHILS: 28 CELLS/UL (ref 0–200)
ABSOLUTE EOSINOPHILS: 42 CELLS/UL (ref 15–500)
ABSOLUTE LYMPHOCYTES: 945 CELLS/UL (ref 850–3900)
ABSOLUTE MONOCYTES: 539 CELLS/UL (ref 200–950)
ABSOLUTE NEUTROPHILS: 5446 CELLS/UL (ref 1500–7800)
ALBUMIN/GLOBULIN RATIO: 1.5 (CALC) (ref 1–2.5)
ALBUMIN: 4.5 G/DL (ref 3.6–5.1)
ALKALINE PHOSPHATASE: 51 U/L (ref 40–115)
ALT: 34 U/L (ref 9–46)
AST: 39 U/L (ref 10–35)
BASOPHILS: 0.4 %
BILIRUBIN, DIRECT: 0.1 MG/DL
BILIRUBIN, INDIRECT: 0.5 MG/DL (CALC) (ref 0.2–1.2)
BILIRUBIN, TOTAL: 0.6 MG/DL (ref 0.2–1.2)
EOSINOPHILS: 0.6 %
GLOBULIN: 3 G/DL (CALC) (ref 1.9–3.7)
HEMATOCRIT: 47.3 % (ref 38.5–50)
HEMOGLOBIN: 16.5 G/DL (ref 13.2–17.1)
LYMPHOCYTES: 13.5 %
MCH: 30.5 PG (ref 27–33)
MCHC: 34.9 G/DL (ref 32–36)
MCV: 87.4 FL (ref 80–100)
MONOCYTES: 7.7 %
MPV: 11.4 FL (ref 7.5–12.5)
NEUTROPHILS: 77.8 %
PLATELET COUNT: 187 THOUSAND/UL (ref 140–400)
PROTEIN, TOTAL: 7.5 G/DL (ref 6.1–8.1)
RDW: 14.8 % (ref 11–15)
RED BLOOD CELL COUNT: 5.41 MILLION/UL (ref 4.2–5.8)
WHITE BLOOD CELL COUNT: 7 THOUSAND/UL (ref 3.8–10.8)

## 2019-06-03 RX ORDER — AZATHIOPRINE 50 MG/1
250 TABLET ORAL DAILY
Qty: 450 TABLET | Refills: 1 | Status: SHIPPED | OUTPATIENT
Start: 2019-06-03 | End: 2019-12-10

## 2019-06-03 NOTE — TELEPHONE ENCOUNTER
Requested Prescriptions     Pending Prescriptions Disp Refills   • AZATHIOPRINE 50 MG Oral Tab [Pharmacy Med Name: AZATHIOPRINE 50 MG TABLET] 450 tablet 1     Sig: TAKE 5 TABLETS (250 MG TOTAL) BY MOUTH DAILY     LOV-6/18/18  LR-12/6/18

## 2019-06-03 NOTE — TELEPHONE ENCOUNTER
Patient's last office visit in 2018. He has an upcoming procedure with Dr. Susan Zhang. Up-to-date on labs every 3 months. Has been stable on azathioprine.

## 2019-07-02 ENCOUNTER — PATIENT MESSAGE (OUTPATIENT)
Dept: GASTROENTEROLOGY | Facility: CLINIC | Age: 54
End: 2019-07-02

## 2019-07-02 DIAGNOSIS — K50.80 CROHN'S DISEASE OF BOTH SMALL AND LARGE INTESTINE WITHOUT COMPLICATION (HCC): Primary | ICD-10-CM

## 2019-07-03 NOTE — TELEPHONE ENCOUNTER
From: Dia Nicely  To: Rasta Beckford MD  Sent: 7/2/2019 6:53 PM CDT  Subject: Non-Urgent Medical Question    hello,i will need to get my standing order updated with [facility]. i go to location in [facility].  also,could

## 2019-07-09 NOTE — TELEPHONE ENCOUNTER
Please inform the patient that a CMP and CBC will be ordered to be done now. The former test should be done once yearly. A CBC and hepatic function panel should be done every 3 months thereafter. I have entered the standing orders for these tests.

## 2019-07-23 ENCOUNTER — ANESTHESIA (OUTPATIENT)
Dept: ENDOSCOPY | Facility: HOSPITAL | Age: 54
End: 2019-07-23

## 2019-07-23 ENCOUNTER — HOSPITAL ENCOUNTER (OUTPATIENT)
Facility: HOSPITAL | Age: 54
Setting detail: HOSPITAL OUTPATIENT SURGERY
Discharge: HOME | End: 2019-07-23
Attending: INTERNAL MEDICINE | Admitting: INTERNAL MEDICINE

## 2019-07-23 ENCOUNTER — ANESTHESIA EVENT (OUTPATIENT)
Dept: ENDOSCOPY | Facility: HOSPITAL | Age: 54
End: 2019-07-23

## 2019-07-23 DIAGNOSIS — K50.919 CROHN'S DISEASE WITH COMPLICATION, UNSPECIFIED GASTROINTESTINAL TRACT LOCATION (HCC): ICD-10-CM

## 2019-07-23 PROCEDURE — 0DBN8ZX EXCISION OF SIGMOID COLON, VIA NATURAL OR ARTIFICIAL OPENING ENDOSCOPIC, DIAGNOSTIC: ICD-10-PCS | Performed by: INTERNAL MEDICINE

## 2019-07-23 PROCEDURE — 0DBB8ZX EXCISION OF ILEUM, VIA NATURAL OR ARTIFICIAL OPENING ENDOSCOPIC, DIAGNOSTIC: ICD-10-PCS | Performed by: INTERNAL MEDICINE

## 2019-07-23 PROCEDURE — 45385 COLONOSCOPY W/LESION REMOVAL: CPT | Performed by: INTERNAL MEDICINE

## 2019-07-23 PROCEDURE — 45380 COLONOSCOPY AND BIOPSY: CPT | Performed by: INTERNAL MEDICINE

## 2019-07-23 RX ORDER — LIDOCAINE HYDROCHLORIDE 10 MG/ML
INJECTION, SOLUTION EPIDURAL; INFILTRATION; INTRACAUDAL; PERINEURAL AS NEEDED
Status: DISCONTINUED | OUTPATIENT
Start: 2019-07-23 | End: 2019-07-23 | Stop reason: SURG

## 2019-07-23 RX ORDER — SODIUM CHLORIDE, SODIUM LACTATE, POTASSIUM CHLORIDE, CALCIUM CHLORIDE 600; 310; 30; 20 MG/100ML; MG/100ML; MG/100ML; MG/100ML
INJECTION, SOLUTION INTRAVENOUS CONTINUOUS
Status: DISCONTINUED | OUTPATIENT
Start: 2019-07-23 | End: 2019-07-23

## 2019-07-23 RX ADMIN — LIDOCAINE HYDROCHLORIDE 25 MG: 10 INJECTION, SOLUTION EPIDURAL; INFILTRATION; INTRACAUDAL; PERINEURAL at 07:28:00

## 2019-07-23 RX ADMIN — SODIUM CHLORIDE, SODIUM LACTATE, POTASSIUM CHLORIDE, CALCIUM CHLORIDE: 600; 310; 30; 20 INJECTION, SOLUTION INTRAVENOUS at 08:00:00

## 2019-07-23 RX ADMIN — SODIUM CHLORIDE, SODIUM LACTATE, POTASSIUM CHLORIDE, CALCIUM CHLORIDE: 600; 310; 30; 20 INJECTION, SOLUTION INTRAVENOUS at 07:23:00

## 2019-07-23 NOTE — ANESTHESIA PREPROCEDURE EVALUATION
Anesthesia PreOp Note    HPI:     Gregory Moreno is a 46year old male who presents for preoperative consultation requested by: Lamont Garcia MD    Date of Surgery: 7/23/2019    Procedure(s):  COLONOSCOPY  Indication: Crohn's disease with complicat Date Noted: 12/14/2007      Overweight         Date Noted: 05/03/2006      Chalazion         Date Noted: 05/11/2005        Past Medical History:   Diagnosis Date   • Crohn's disease ([address] 3/2000   • High blood pressure    • History of kidney surgery Highest education level: Not on file    Occupational History      Not on file    Social Needs      Financial resource strain: Not on file      Food insecurity:        Worry: Not on file        Inability: Not on file      Transportation needs:        Me Grew up on a farm: Not Asked        History of tanning: Not Asked        Outdoor occupation: Not Asked        Pt has a pacemaker: No        Pt has a defibrillator: No        Reaction to local anesthetic: No    Social History Narrative      Not on august

## 2019-07-23 NOTE — OPERATIVE REPORT
[facility] Endoscopy Report      Date of Procedure:  07/23/19      Preoperative Diagnosis:  Crohn's disease evaluate disease extent and activity      Postoperative Diagnosis:  1. Focal ileitis  2.   Widely patent ileoileal and colocolonic a superficial 6 mm ulceration a few centimeters proximal to the ileocecal valve. The ileum was slightly edematous and ringlike at this point.   It was difficult to ascertain whether this area was at or immediately distal to the patient's ileoileal anastomosi

## 2019-07-23 NOTE — ANESTHESIA POSTPROCEDURE EVALUATION
Patient: Domitila Navarrete    Procedure Summary     Date:  07/23/19 Room / Location:  [facility] ENDOSCOPY 01 / [facility] ENDOSCOPY    Anesthesia Start:  5859 Anesthesia Stop:  5830    Procedure:  COLONOSCOPY (N/A ) Diagnosis:       Crohn's disease with complication, unsp

## 2019-07-23 NOTE — H&P
History & Physical Examination    Patient Name: Fabricio Leal  MRN: [id number]  [facility]: [id number]  YOB: 1965    Diagnosis: Crohn's disease evaluate disease extent and activity        Medications Prior to Admission:  AZATHIOPRINE 50 MG Oral Tab of beer per week      Frequency: Monthly or less      Drinks per session: 1 or 2      Binge frequency: Never      Comment: occasional beer      SYSTEM Check if Review is Normal Check if Physical Exam is Normal If not normal, please explain:   CAT Tobias.Regina Medel [

## 2019-07-24 VITALS
HEART RATE: 71 BPM | OXYGEN SATURATION: 94 % | SYSTOLIC BLOOD PRESSURE: 138 MMHG | BODY MASS INDEX: 40.92 KG/M2 | HEIGHT: 68 IN | DIASTOLIC BLOOD PRESSURE: 61 MMHG | RESPIRATION RATE: 10 BRPM | WEIGHT: 270 LBS

## 2019-08-02 ENCOUNTER — MED REC SCAN ONLY (OUTPATIENT)
Dept: GASTROENTEROLOGY | Facility: CLINIC | Age: 54
End: 2019-08-02

## 2019-10-16 ENCOUNTER — PATIENT MESSAGE (OUTPATIENT)
Dept: GASTROENTEROLOGY | Facility: CLINIC | Age: 54
End: 2019-10-16

## 2019-10-16 NOTE — TELEPHONE ENCOUNTER
From: Adore Lees  To: Liban Amaya MD  Sent: 10/16/2019 5:14 PM CDT  Subject: Test Results Question    hel. did you recieve my lab results from [facility]? ? i went last week or so. thanks. .. Cynthia beckett hope i submitted the right labs to be mukund

## 2019-10-18 ENCOUNTER — PATIENT MESSAGE (OUTPATIENT)
Dept: GASTROENTEROLOGY | Facility: CLINIC | Age: 54
End: 2019-10-18

## 2019-10-18 NOTE — TELEPHONE ENCOUNTER
From: Татьяна Naqvi  To: Cindy St MD  Sent: 10/18/2019 1:36 PM CDT  Subject: Test Results Question    ok. [facility] and they told me to forward this number so you can get my blood results.  [phone] #[phone] sorry their making th

## 2019-10-31 NOTE — TELEPHONE ENCOUNTER
Spoke to Fortunato at [facility] and had her resend results as we did not receive them yet.      This TE will be closed see TE from 10/31/19 regarding results from [facility].

## 2019-12-10 ENCOUNTER — OFFICE VISIT (OUTPATIENT)
Dept: INTERNAL MEDICINE CLINIC | Facility: CLINIC | Age: 54
End: 2019-12-10

## 2019-12-10 VITALS
SYSTOLIC BLOOD PRESSURE: 130 MMHG | OXYGEN SATURATION: 98 % | DIASTOLIC BLOOD PRESSURE: 80 MMHG | WEIGHT: 276 LBS | HEART RATE: 68 BPM | HEIGHT: 68 IN | RESPIRATION RATE: 24 BRPM | BODY MASS INDEX: 41.83 KG/M2

## 2019-12-10 DIAGNOSIS — Z12.5 PROSTATE CANCER SCREENING: ICD-10-CM

## 2019-12-10 DIAGNOSIS — E78.5 HYPERLIPIDEMIA LDL GOAL <100: ICD-10-CM

## 2019-12-10 DIAGNOSIS — K50.10 CROHN'S DISEASE OF COLON WITHOUT COMPLICATION (HCC): ICD-10-CM

## 2019-12-10 DIAGNOSIS — I10 ESSENTIAL HYPERTENSION: Primary | ICD-10-CM

## 2019-12-10 DIAGNOSIS — Z28.21 REFUSED INFLUENZA VACCINE: ICD-10-CM

## 2019-12-10 DIAGNOSIS — R73.9 HYPERGLYCEMIA: ICD-10-CM

## 2019-12-10 PROCEDURE — 99214 OFFICE O/P EST MOD 30 MIN: CPT | Performed by: INTERNAL MEDICINE

## 2019-12-10 RX ORDER — AZATHIOPRINE 50 MG/1
250 TABLET ORAL DAILY
Qty: 450 TABLET | Refills: 1 | Status: SHIPPED | OUTPATIENT
Start: 2019-12-10 | End: 2020-03-09

## 2019-12-10 NOTE — PROGRESS NOTES
a1c  HPI:   Subjective Patient presents with:  Blood Pressure  Refill Request     Ken Bernal is a 53year old male who presents for follow-up of elevated blood pressure. He is exercising and is not adherent to a low-salt diet.  Blood pressure is high at appropriate: After Visit Summary Template Not Found     This Print Group is only intended to be used in the After Visit Summary   and can only be used in a report that uses a released After Visit Summary   Template.        He  has a past medical history of from the following:    Height as of this encounter: 68\". Weight as of this encounter: 276 lb (125.2 kg).    GENERAL: well developed,obese,in no apparent distress  SKIN: no rashes,no suspicious lesions  NECK: supple,no adenopathy,no bruits  LUNGS: clear

## 2019-12-10 NOTE — TELEPHONE ENCOUNTER
Requested Prescriptions     Pending Prescriptions Disp Refills   • AZATHIOPRINE 50 MG Oral Tab [Pharmacy Med Name: AZATHIOPRINE 50 MG TABLET] 450 tablet 1     Sig: TAKE 5 TABLETS (250 MG TOTAL) BY MOUTH DAILY     lov-7/23/19  lr-6/3/19

## 2019-12-17 RX ORDER — AZATHIOPRINE 50 MG/1
250 TABLET ORAL DAILY
Qty: 450 TABLET | Refills: 1 | OUTPATIENT
Start: 2019-12-17 | End: 2020-03-16

## 2019-12-17 NOTE — TELEPHONE ENCOUNTER
Duplicate refill. See TE 12/10/20  Requested Prescriptions     Pending Prescriptions Disp Refills   • azathioprine 50 MG Oral Tab 450 tablet 1     Sig: Take 5 tablets (250 mg total) by mouth daily.

## 2020-03-11 DIAGNOSIS — I10 ESSENTIAL HYPERTENSION: ICD-10-CM

## 2020-03-12 RX ORDER — LISINOPRIL AND HYDROCHLOROTHIAZIDE 25; 20 MG/1; MG/1
TABLET ORAL
Qty: 90 TABLET | Refills: 0 | Status: SHIPPED | OUTPATIENT
Start: 2020-03-12 | End: 2020-05-13

## 2020-05-13 DIAGNOSIS — I10 ESSENTIAL HYPERTENSION: ICD-10-CM

## 2020-05-13 RX ORDER — LISINOPRIL AND HYDROCHLOROTHIAZIDE 25; 20 MG/1; MG/1
TABLET ORAL
Qty: 90 TABLET | Refills: 3 | Status: SHIPPED | OUTPATIENT
Start: 2020-05-13 | End: 2021-06-17

## 2020-06-03 ENCOUNTER — PATIENT MESSAGE (OUTPATIENT)
Dept: GASTROENTEROLOGY | Facility: CLINIC | Age: 55
End: 2020-06-03

## 2020-06-03 RX ORDER — AZATHIOPRINE 50 MG/1
TABLET ORAL
Qty: 450 TABLET | Refills: 1 | Status: SHIPPED | OUTPATIENT
Start: 2020-06-03 | End: 2020-12-07

## 2020-06-03 NOTE — TELEPHONE ENCOUNTER
Please advise on refill request below. Thank you.    colonoscopy done on 07/23/19 with Dr. Jose Daniel Reyes and  LV 06/18/18     LR 12/10/19 #450 with 1 refill by Yvette Nurse    Next appointment on 07/01/2020 at 1:30 pm with Dr. Avi Rico. Pl

## 2020-06-03 NOTE — TELEPHONE ENCOUNTER
From: Daylin Jin  To: Selene Marks MD  Sent: 6/3/2020 2:02 PM CDT  Subject: Prescription Question    hello. i need my azathioprine meds refilled. ...i recently switched to [facility] in [place], [place] [phone] am going for blood test this month.

## 2020-06-03 NOTE — TELEPHONE ENCOUNTER
Please contact the patient. Medication was refilled. He is due for laboratory testing (CBC and hepatic function panel). Keep appointment on 7/1/2020.

## 2020-06-30 ENCOUNTER — OFFICE VISIT (OUTPATIENT)
Dept: INTERNAL MEDICINE CLINIC | Facility: CLINIC | Age: 55
End: 2020-06-30

## 2020-06-30 VITALS
WEIGHT: 278 LBS | HEART RATE: 76 BPM | TEMPERATURE: 97 F | SYSTOLIC BLOOD PRESSURE: 112 MMHG | DIASTOLIC BLOOD PRESSURE: 80 MMHG | OXYGEN SATURATION: 99 % | BODY MASS INDEX: 42.13 KG/M2 | HEIGHT: 68 IN

## 2020-06-30 DIAGNOSIS — E66.09 CLASS 2 OBESITY DUE TO EXCESS CALORIES WITHOUT SERIOUS COMORBIDITY WITH BODY MASS INDEX (BMI) OF 36.0 TO 36.9 IN ADULT: ICD-10-CM

## 2020-06-30 DIAGNOSIS — R73.01 FASTING HYPERGLYCEMIA: ICD-10-CM

## 2020-06-30 DIAGNOSIS — E78.1 HYPERTRIGLYCERIDEMIA: ICD-10-CM

## 2020-06-30 DIAGNOSIS — K50.10 CROHN'S DISEASE OF COLON WITHOUT COMPLICATION (HCC): ICD-10-CM

## 2020-06-30 DIAGNOSIS — I10 ESSENTIAL HYPERTENSION: Primary | ICD-10-CM

## 2020-06-30 PROCEDURE — 99213 OFFICE O/P EST LOW 20 MIN: CPT | Performed by: INTERNAL MEDICINE

## 2020-06-30 NOTE — PROGRESS NOTES
HPI:    Patient ID: Sherri Molina is a 53year old male. HPI     53year old male who presents for follow-up of elevated blood pressure. BP was 150s systole at home. Had been eating  Tv dinners, pizza.   Not exercising too much due to Covid 19 pandemic Medications   Medication Sig Dispense Refill   • azathioprine 50 MG Oral Tab Take 5 tablets (250 mg total) by mouth daily 450 tablet 1   • LISINOPRIL-HYDROCHLOROTHIAZIDE 20-25 MG Oral Tab TAKE 1 TABLET BY MOUTH EVERY DAY 90 tablet 3     Allergies:  Penicil 36.9 in adult  Limit intake of red meat, sugary foods and beverages, saturated fats, sodium and alcohol. Encourage intake of vegetables, whole grains, fiber, low -fat dairy product, poultry, nuts. Aerobic exercise 30 mins 3 x weekly    5.  Hypertriglycerid

## 2020-07-01 ENCOUNTER — OFFICE VISIT (OUTPATIENT)
Dept: GASTROENTEROLOGY | Facility: CLINIC | Age: 55
End: 2020-07-01

## 2020-07-01 VITALS
WEIGHT: 272 LBS | SYSTOLIC BLOOD PRESSURE: 140 MMHG | DIASTOLIC BLOOD PRESSURE: 87 MMHG | BODY MASS INDEX: 41.22 KG/M2 | HEART RATE: 71 BPM | HEIGHT: 68 IN

## 2020-07-01 DIAGNOSIS — K50.80 CROHN'S DISEASE OF BOTH SMALL AND LARGE INTESTINE WITHOUT COMPLICATION (HCC): Primary | ICD-10-CM

## 2020-07-01 PROCEDURE — 99213 OFFICE O/P EST LOW 20 MIN: CPT | Performed by: INTERNAL MEDICINE

## 2020-07-01 NOTE — PROGRESS NOTES
HPI:    Patient ID: Elsie Ramos is a 53year old male. HPI   Sariah returns in follow-up.  He was last seen in at his colonoscopy in July 2019. As per previous notes he has a history of Crohn's disease dating back approximately 20 years (2000).  He d had no abdominal pain. He typically has 2 formed stools daily and rarely 3. He has noted no bleeding. Karin Saleh has been compliant with 250 mg daily of azathioprine. He has switched blood work to an outside lab.   He will have these results forwarded to [facility] Nursing note and vitals reviewed. ASSESSMENT/PLAN:   Crohn's disease of both small and large intestine without complication (hcc)  (primary encounter diagnosis)  Jacoby Deleon has a history of complex fistulizing Crohn's disease dating back 20 years.

## 2020-07-01 NOTE — PATIENT INSTRUCTIONS
1.  Continue azathioprine at current dose. 2.  Blood work every 3 months. 3.  See Dr. Telly Nunez. 4.  Follow-up office visit in 1 year or sooner if issues arise.

## 2020-07-01 NOTE — PATIENT INSTRUCTIONS
Understanding Carbohydrates    A car needs the right type of fuel to run. And you need the right kind of food to function. To keep your energy level up, your body needs food that has carbohydrates.  But carbs raise blood sugar levels higher and faster lb Keep track of the amount of carbs you eat. This can help you keep the right balance of carbs, physical activity, and medicine. The amount of carbs you need will be different from what other people need. How much you need depends on many things.  These inclu · Check the serving size. The information on the label is based on that serving size. If you eat more than the listed serving size, you may have to double or triple the other information on the label.   · Check the total grams of carbohydrates.  Total Naye Choose an activity that makes your heart and lungs work harder than they do when you rest or walk normally. This aerobic exercise can improve the way your heart and other muscles use oxygen.  Make it fun by exercising with a friend and choosing an activity

## 2020-07-06 ENCOUNTER — OFFICE VISIT (OUTPATIENT)
Dept: DERMATOLOGY CLINIC | Facility: CLINIC | Age: 55
End: 2020-07-06

## 2020-07-06 ENCOUNTER — TELEPHONE (OUTPATIENT)
Dept: GASTROENTEROLOGY | Facility: CLINIC | Age: 55
End: 2020-07-06

## 2020-07-06 DIAGNOSIS — D23.30 BENIGN NEOPLASM OF SKIN OF FACE: ICD-10-CM

## 2020-07-06 DIAGNOSIS — D23.5 BENIGN NEOPLASM OF SKIN OF TRUNK, EXCEPT SCROTUM: ICD-10-CM

## 2020-07-06 DIAGNOSIS — D22.9 MULTIPLE NEVI: ICD-10-CM

## 2020-07-06 DIAGNOSIS — L81.4 LENTIGO: ICD-10-CM

## 2020-07-06 DIAGNOSIS — D23.60 BENIGN NEOPLASM OF SKIN OF UPPER LIMB, INCLUDING SHOULDER, UNSPECIFIED LATERALITY: ICD-10-CM

## 2020-07-06 DIAGNOSIS — D23.4 BENIGN NEOPLASM OF SCALP AND SKIN OF NECK: ICD-10-CM

## 2020-07-06 DIAGNOSIS — K50.80 CROHN'S DISEASE OF BOTH SMALL AND LARGE INTESTINE WITHOUT COMPLICATION (HCC): Primary | ICD-10-CM

## 2020-07-06 DIAGNOSIS — L82.1 SEBORRHEIC KERATOSES: Primary | ICD-10-CM

## 2020-07-06 PROCEDURE — 99213 OFFICE O/P EST LOW 20 MIN: CPT | Performed by: DERMATOLOGY

## 2020-07-08 NOTE — TELEPHONE ENCOUNTER
Please inform the patient that his liver enzymes were normal.  White cell count was normal.  Hemoglobin is just above normal which is likely of no consequence. Blood work should be repeated in 3 months. I believe that a standing order is in place.   If no

## 2020-07-09 NOTE — TELEPHONE ENCOUNTER
Spoke with Raine Carrera he verbalized understanding of below. He will have his blood drawn again in October 2020 and every 3 months after that. [phone] [facility]. Orders also mailed to patient.

## 2020-07-12 NOTE — PROGRESS NOTES
Ruddysunny Courser is a 53year old male. HPI:     CC:  Patient presents with:  Full Skin Exam: LOV 3/24/2017 - Pt presenting for full body skin exam.  Pt denies personal hx of skin cancer. Pt denies family hx of skin cancer.         Allergies:  Penicillin G Penicillin G            RASH    Past Medical History:   Diagnosis Date   • Crohn's disease ([facility] [address] 3/2000   • High blood pressure    • History of kidney surgery     per NG (ECD) : \"kidney stent removal\"   • Lipid screening 12-   • Other a on file        Active member of club or organization: Not on file        Attends meetings of clubs or organizations: Not on file        Relationship status: Not on file      Intimate partner violence:        Fear of current or ex partner: Not on file Patient has been in their usual state of health. History, medications, allergies reviewed as noted. ROS:  Denies any other systemic complaints. No new or changeing lesions other than noted above.  No fevers, chills, night sweats, unusual sun sens larger lentigines unchanged  4 larger lentigo observed. Benign keratosis left hand observe. Pinkish patch right scapula observed benign-appearing nevus. Please refer to map for specific lesions. See additional diagnoses.   Pros cons of various therapi

## 2020-10-09 ENCOUNTER — TELEPHONE (OUTPATIENT)
Dept: GASTROENTEROLOGY | Facility: CLINIC | Age: 55
End: 2020-10-09

## 2020-10-09 DIAGNOSIS — D75.1 POLYCYTHEMIA: Primary | ICD-10-CM

## 2020-10-13 ENCOUNTER — PATIENT MESSAGE (OUTPATIENT)
Dept: GASTROENTEROLOGY | Facility: CLINIC | Age: 55
End: 2020-10-13

## 2020-10-14 NOTE — TELEPHONE ENCOUNTER
From: Elsie Ramos  To: Bren Sahu MD  Sent: 10/13/2020 7:56 PM CDT  Subject: Test Results Question    hello,i wondered if [facility] got my blood test results sent to your office.  i gave them the fax number and told them to make sure it was sent th

## 2020-10-14 NOTE — TELEPHONE ENCOUNTER
Dr. Jewels Yates    Please advise on results of labs received on 10/9/2020. Results scanned into 10/9/2020 telephone encounter. Thank you.

## 2020-10-16 NOTE — TELEPHONE ENCOUNTER
I spoke to Eli. He is feeling well. His chemistries were normal with the exception of a trivial elevation of AST. The patient's white cell count and platelets were acceptable, however, the patient's hemoglobin was 17.9.   He has never been polycythemic

## 2020-10-16 NOTE — TELEPHONE ENCOUNTER
See 10/9/2020 telephone encounter with documentation from 10/16/2020:    \"I spoke to Eli. He is feeling well. His chemistries were normal with the exception of a trivial elevation of AST.   The patient's white cell count and platelets were acceptable,

## 2020-10-16 NOTE — TELEPHONE ENCOUNTER
I left a message on the patient's voicemail that I was calling with test results. I will contact the patient tomorrow.

## 2020-11-23 ENCOUNTER — PATIENT MESSAGE (OUTPATIENT)
Dept: GASTROENTEROLOGY | Facility: CLINIC | Age: 55
End: 2020-11-23

## 2020-11-24 NOTE — TELEPHONE ENCOUNTER
From: Tony Villarreal  To: Shailesh Zavala MD  Sent: 11/23/2020 3:15 PM CST  Subject: Test Results Question    hello there.   i wondered if you got the results from my recent [facility] blood test??  i went in on the 11 of november,and as instructed, i ate and

## 2020-11-25 ENCOUNTER — PATIENT MESSAGE (OUTPATIENT)
Dept: GASTROENTEROLOGY | Facility: CLINIC | Age: 55
End: 2020-11-25

## 2020-11-27 NOTE — TELEPHONE ENCOUNTER
Asaf Carvajal,    Our Thanksgiving was quiet and good. I hope that you had a great Thanksgiving as well!     Conditions that can be associated with high hemoglobin levels include any chronic condition which lowers the concentration of oxygen in the bloodstream.

## 2020-12-08 ENCOUNTER — PATIENT MESSAGE (OUTPATIENT)
Dept: GASTROENTEROLOGY | Facility: CLINIC | Age: 55
End: 2020-12-08

## 2020-12-08 RX ORDER — AZATHIOPRINE 50 MG/1
TABLET ORAL
Qty: 450 TABLET | Refills: 1 | Status: SHIPPED | OUTPATIENT
Start: 2020-12-08 | End: 2021-10-20

## 2020-12-08 NOTE — TELEPHONE ENCOUNTER
Patient contacted and advised to  medication at [facility] in [place].

## 2020-12-08 NOTE — TELEPHONE ENCOUNTER
From: Elliot Thacker  To: Cande Singh MD  Sent: 12/8/2020 3:27 PM CST  Subject: Prescription Question    hello. i was a bit confused. why does it say i need to  my pills at providers office? ? or did i mis understand and its at pharmacy for

## 2020-12-08 NOTE — TELEPHONE ENCOUNTER
I called and spoke to [facility] the pharmacist and read off the medication refill that Dr Marla Stroud authorized today. I changed the pharmacy on file per patient.      I called and left a voicemail message that medication was refilled and called in to the M

## 2020-12-08 NOTE — TELEPHONE ENCOUNTER
Requested Prescriptions     Pending Prescriptions Disp Refills   • azathioprine 50 MG Oral Tab 450 tablet 1     Sig: Take 5 tablets (250 mg total) by mouth daily     LOV 7/01/2020  LR 6/03/2020    em

## 2021-01-22 DIAGNOSIS — Z12.5 PROSTATE CANCER SCREENING: Primary | ICD-10-CM

## 2021-01-22 DIAGNOSIS — R73.01 FASTING HYPERGLYCEMIA: ICD-10-CM

## 2021-01-22 DIAGNOSIS — Z00.00 GENERAL MEDICAL EXAM: ICD-10-CM

## 2021-02-21 LAB
AMB EXT CHOL/HDL RATIO: 4.5
AMB EXT CREATININE: 1.45 MG/DL
AMB EXT GFR: 53
AMB EXT GLUCOSE: 113 MG/DL
AMB EXT HEMATOCRIT: 50.3
AMB EXT HEMOGLOBIN: 17.8
AMB EXT LDL CHOLESTEROL, DIRECT: 74 MG/DL
AMB EXT PLATELETS: 220
AMB EXT TRIGLYCERIDES: 273 MG/DL

## 2021-02-24 LAB — AMB EXT HDL CHOLESTEROL: 31 MG/DL

## 2021-02-26 ENCOUNTER — TELEPHONE (OUTPATIENT)
Dept: INTERNAL MEDICINE CLINIC | Facility: CLINIC | Age: 56
End: 2021-02-26

## 2021-03-05 ENCOUNTER — PATIENT MESSAGE (OUTPATIENT)
Dept: GASTROENTEROLOGY | Facility: CLINIC | Age: 56
End: 2021-03-05

## 2021-03-05 NOTE — TELEPHONE ENCOUNTER
From: Doris Ugarte  To: Rajiv Howell MD  Sent: 3/5/2021 11:33 AM CST  Subject: Other    the file for bloodwork they sent me wont go through for some reason,,,,i will give you my direct info on how you can see results. .. [employer]. com account

## 2021-03-05 NOTE — TELEPHONE ENCOUNTER
Dr. Uribe Host-    Patient's lab results from [address] [place] [address] from collection date 2/24/21 placed on your desk to be reviewed.

## 2021-03-09 ENCOUNTER — MED REC SCAN ONLY (OUTPATIENT)
Dept: GASTROENTEROLOGY | Facility: CLINIC | Age: 56
End: 2021-03-09

## 2021-03-11 LAB
AMB EXT HGBA1C: 5.4 %
AMB EXT PSA SCREEN: 3.4 NG/ML

## 2021-03-11 NOTE — TELEPHONE ENCOUNTER
I faxed patient's lab results received to Dr. Mcdaniel  office at [phone] at the request of the patient.

## 2021-03-23 ENCOUNTER — OFFICE VISIT (OUTPATIENT)
Dept: INTERNAL MEDICINE CLINIC | Facility: CLINIC | Age: 56
End: 2021-03-23

## 2021-03-23 VITALS
OXYGEN SATURATION: 98 % | WEIGHT: 278 LBS | DIASTOLIC BLOOD PRESSURE: 76 MMHG | HEART RATE: 67 BPM | SYSTOLIC BLOOD PRESSURE: 135 MMHG | BODY MASS INDEX: 42.13 KG/M2 | HEIGHT: 68 IN

## 2021-03-23 DIAGNOSIS — R73.01 FASTING HYPERGLYCEMIA: ICD-10-CM

## 2021-03-23 DIAGNOSIS — R06.83 SNORES: ICD-10-CM

## 2021-03-23 DIAGNOSIS — N40.0 BENIGN PROSTATIC HYPERPLASIA WITHOUT LOWER URINARY TRACT SYMPTOMS: ICD-10-CM

## 2021-03-23 DIAGNOSIS — K50.10 CROHN'S DISEASE OF COLON WITHOUT COMPLICATION (HCC): ICD-10-CM

## 2021-03-23 DIAGNOSIS — E66.01 MORBID OBESITY (HCC): ICD-10-CM

## 2021-03-23 DIAGNOSIS — I10 ESSENTIAL HYPERTENSION: Primary | ICD-10-CM

## 2021-03-23 DIAGNOSIS — E78.1 HYPERTRIGLYCERIDEMIA: ICD-10-CM

## 2021-03-23 DIAGNOSIS — D75.1 ERYTHROCYTOSIS: ICD-10-CM

## 2021-03-23 PROCEDURE — 99214 OFFICE O/P EST MOD 30 MIN: CPT | Performed by: INTERNAL MEDICINE

## 2021-03-23 PROCEDURE — 3008F BODY MASS INDEX DOCD: CPT | Performed by: INTERNAL MEDICINE

## 2021-03-23 PROCEDURE — 3075F SYST BP GE 130 - 139MM HG: CPT | Performed by: INTERNAL MEDICINE

## 2021-03-23 PROCEDURE — 3078F DIAST BP <80 MM HG: CPT | Performed by: INTERNAL MEDICINE

## 2021-03-23 NOTE — PROGRESS NOTES
HPI:    Patient ID: Frances Gandara is a 63year old male. HPI  Here for f/u . History of hypertension, hypertriglyceridemia, fasting hyperglycemia, regional enteritis, urinary calculus, BPH, elevated PSA, morbid obesity. BP was 200/100 a month ago. History:   Procedure Laterality Date   • APPENDECTOMY  2013   • COLECTOMY  2013   • COLONOSCOPY     • COLONOSCOPY N/A 7/23/2019    Performed by Keisha Burciaga MD at [facility] ENDOSCOPY      Family History   Problem Relation Age of Onset   • Crohn's Diseas kg)  07/01/20 : 272 lb (123.4 kg)  06/30/20 : 278 lb (126.1 kg)  12/10/19 : 276 lb (125.2 kg)  07/23/19 : 270 lb (122.5 kg)  03/01/19 : 270 lb (122.5 kg)     BP Readings from Last 3 Encounters:  03/23/21 : 135/76  07/01/20 : 140/87  06/30/20 : 112/80 HEMOGLOBIN A1c Latest Units: %   5.4 ((NONE))   CREATININE Latest Units: mg/dL 1.45 ((NONE))     GFR Unknown 53 ((NONE))     Triglycerides Latest Units: mg/dL 273 ((NONE))     HDL Cholesterol Latest Units: mg/dL  31 ((NONE))    Chol/HDL Ratio Unknown 4.5 and all questions were answered  This billing was  spent on reviewing old records, labs, medications, radiology tests  and decision making.   Appropriate medical decision-making and tests are ordered as detailed i

## 2021-03-24 PROBLEM — L82.1 SEBORRHEIC KERATOSIS: Status: ACTIVE | Noted: 2021-03-24

## 2021-03-24 PROBLEM — L81.4 LENTIGO: Status: ACTIVE | Noted: 2021-03-24

## 2021-03-25 NOTE — PATIENT INSTRUCTIONS
Low-Salt Choices  Eating salt (sodium) can make your body retain too much water. Extra water makes your heart work harder. Canned, packaged, and frozen foods are easy to prepare. But they are often high in sodium.  Here are some ideas for low-salt foods y In fact, eating healthier can improve several of your heart risks at once. For instance, it helps you manage weight, cholesterol, and blood pressure.  Here are ideas to help you make heart-healthy changes without giving up all the foods and flavors you love diary to record what you eat and how often you exercise. Choose the right foods  Aim to make these foods staples of your diet.  If you have diabetes, you may have different recommendations than what is listed here:   · Fruits and vegetables provide plenty lower-sodium recipes without a lot of added sugars. Also try these tips:   · Remove fat from meat and skin from poultry before cooking. · Skim fat from the surface of soups and sauces.   · Broil, roast, boil, bake, steam, grill, or microwave food without a

## 2021-06-10 ENCOUNTER — OFFICE VISIT (OUTPATIENT)
Dept: GASTROENTEROLOGY | Facility: CLINIC | Age: 56
End: 2021-06-10

## 2021-06-10 ENCOUNTER — TELEPHONE (OUTPATIENT)
Dept: GASTROENTEROLOGY | Facility: CLINIC | Age: 56
End: 2021-06-10

## 2021-06-10 VITALS
DIASTOLIC BLOOD PRESSURE: 86 MMHG | HEIGHT: 68 IN | WEIGHT: 252 LBS | BODY MASS INDEX: 38.19 KG/M2 | HEART RATE: 68 BPM | SYSTOLIC BLOOD PRESSURE: 130 MMHG

## 2021-06-10 DIAGNOSIS — K50.80 CROHN'S DISEASE OF BOTH SMALL AND LARGE INTESTINE WITHOUT COMPLICATION (HCC): Primary | ICD-10-CM

## 2021-06-10 PROCEDURE — 99213 OFFICE O/P EST LOW 20 MIN: CPT | Performed by: INTERNAL MEDICINE

## 2021-06-10 PROCEDURE — 3008F BODY MASS INDEX DOCD: CPT | Performed by: INTERNAL MEDICINE

## 2021-06-10 PROCEDURE — 3075F SYST BP GE 130 - 139MM HG: CPT | Performed by: INTERNAL MEDICINE

## 2021-06-10 PROCEDURE — 3079F DIAST BP 80-89 MM HG: CPT | Performed by: INTERNAL MEDICINE

## 2021-06-10 NOTE — PROGRESS NOTES
HPI/Subjective:   Patient ID: Juany Hopkins is a 63year old male. HPI  Ruchi Douglas returns in follow-up.  He was last seen in at his colonoscopy in July 2019.     As per previous notes he has a history of Crohn's disease dating back approximately 20 years (2 last year. He denies nausea, vomiting, dysphagia, odynophagia or heartburn. He has no abdominal pain. He typically has #2 formed stools daily without blood. At times he may have no bowel movements in a day. He denies fevers, chills or sweats.     He supple. Lymphadenopathy:      Cervical: No cervical adenopathy. Neurological:      Mental Status: He is alert and oriented to person, place, and time.    Psychiatric:         Behavior: Behavior normal.     Laboratory testing:  Labs drawn on 6/2/2021 (Fa

## 2021-06-10 NOTE — PATIENT INSTRUCTIONS
1.  Continue current dose of azathioprine. 2.  Repeat blood work in 3 months. 3.  Follow-up with Dr. Janine Young from dermatology. 4.  Follow-up office visit in 1 year or sooner if issues arise.

## 2021-06-10 NOTE — TELEPHONE ENCOUNTER
Dr. Vicente Melissa fax from [employer] with patient's labs completed on 6/2/21. Will also place copy on your desk.

## 2021-06-17 DIAGNOSIS — I10 ESSENTIAL HYPERTENSION: ICD-10-CM

## 2021-06-17 RX ORDER — LISINOPRIL AND HYDROCHLOROTHIAZIDE 25; 20 MG/1; MG/1
1 TABLET ORAL DAILY
Qty: 90 TABLET | Refills: 0 | Status: SHIPPED | OUTPATIENT
Start: 2021-06-17 | End: 2021-08-30

## 2021-08-29 DIAGNOSIS — I10 ESSENTIAL HYPERTENSION: ICD-10-CM

## 2021-08-30 RX ORDER — LISINOPRIL AND HYDROCHLOROTHIAZIDE 25; 20 MG/1; MG/1
TABLET ORAL
Qty: 90 TABLET | Refills: 0 | Status: SHIPPED | OUTPATIENT
Start: 2021-08-30 | End: 2021-12-14

## 2021-10-19 ENCOUNTER — PATIENT MESSAGE (OUTPATIENT)
Dept: GASTROENTEROLOGY | Facility: CLINIC | Age: 56
End: 2021-10-19

## 2021-10-20 RX ORDER — AZATHIOPRINE 50 MG/1
TABLET ORAL
Qty: 450 TABLET | Refills: 1 | Status: SHIPPED | OUTPATIENT
Start: 2021-10-20 | End: 2022-01-18

## 2021-10-20 NOTE — TELEPHONE ENCOUNTER
From: Tony Villarreal  To: Shailesh Zavala MD  Sent: 10/19/2021 8:50 PM CDT  Subject: pills    hello. i will need my pills for azathioprine refilled soon. still at [facility] in [facility]. .. 450 pills for 90 day supply.  also,i will get my blood work done o

## 2021-10-20 NOTE — TELEPHONE ENCOUNTER
Medication Quantity Refills Start End   azathioprine 50 MG Oral Tab 450 tablet 1 12/8/2020    Sig:   Take 5 tablets (250 mg total) by mouth daily     Route:   (none)       LOV: 6/10/21  LR: 12/8/2020, 1 refill

## 2021-12-09 DIAGNOSIS — I10 ESSENTIAL HYPERTENSION: ICD-10-CM

## 2021-12-09 RX ORDER — LISINOPRIL AND HYDROCHLOROTHIAZIDE 25; 20 MG/1; MG/1
TABLET ORAL
Qty: 90 TABLET | Refills: 0 | OUTPATIENT
Start: 2021-12-09

## 2021-12-10 DIAGNOSIS — I10 ESSENTIAL HYPERTENSION: ICD-10-CM

## 2021-12-13 RX ORDER — LISINOPRIL AND HYDROCHLOROTHIAZIDE 25; 20 MG/1; MG/1
TABLET ORAL
Qty: 90 TABLET | Refills: 0 | OUTPATIENT
Start: 2021-12-13

## 2021-12-14 DIAGNOSIS — I10 ESSENTIAL HYPERTENSION: ICD-10-CM

## 2021-12-14 RX ORDER — LISINOPRIL AND HYDROCHLOROTHIAZIDE 25; 20 MG/1; MG/1
TABLET ORAL
Qty: 90 TABLET | Refills: 0 | OUTPATIENT
Start: 2021-12-14

## 2021-12-15 RX ORDER — LISINOPRIL AND HYDROCHLOROTHIAZIDE 25; 20 MG/1; MG/1
1 TABLET ORAL DAILY
Qty: 90 TABLET | Refills: 0 | Status: SHIPPED | OUTPATIENT
Start: 2021-12-15

## 2021-12-20 ENCOUNTER — OFFICE VISIT (OUTPATIENT)
Dept: INTERNAL MEDICINE CLINIC | Facility: CLINIC | Age: 56
End: 2021-12-20

## 2021-12-20 VITALS
DIASTOLIC BLOOD PRESSURE: 80 MMHG | SYSTOLIC BLOOD PRESSURE: 130 MMHG | BODY MASS INDEX: 36.68 KG/M2 | OXYGEN SATURATION: 98 % | WEIGHT: 242 LBS | TEMPERATURE: 98 F | HEART RATE: 61 BPM | HEIGHT: 68 IN

## 2021-12-20 DIAGNOSIS — E78.5 DYSLIPIDEMIA: ICD-10-CM

## 2021-12-20 DIAGNOSIS — E66.01 CLASS 2 SEVERE OBESITY DUE TO EXCESS CALORIES WITH SERIOUS COMORBIDITY AND BODY MASS INDEX (BMI) OF 36.0 TO 36.9 IN ADULT (HCC): ICD-10-CM

## 2021-12-20 DIAGNOSIS — D75.1 ERYTHROCYTOSIS: ICD-10-CM

## 2021-12-20 DIAGNOSIS — I10 ESSENTIAL HYPERTENSION: Primary | ICD-10-CM

## 2021-12-20 DIAGNOSIS — R73.01 IFG (IMPAIRED FASTING GLUCOSE): ICD-10-CM

## 2021-12-20 DIAGNOSIS — N18.31 STAGE 3A CHRONIC KIDNEY DISEASE (HCC): ICD-10-CM

## 2021-12-20 DIAGNOSIS — K50.10 CROHN'S DISEASE OF LARGE INTESTINE WITHOUT COMPLICATION (HCC): ICD-10-CM

## 2021-12-20 PROCEDURE — 3008F BODY MASS INDEX DOCD: CPT | Performed by: INTERNAL MEDICINE

## 2021-12-20 PROCEDURE — 3075F SYST BP GE 130 - 139MM HG: CPT | Performed by: INTERNAL MEDICINE

## 2021-12-20 PROCEDURE — 3079F DIAST BP 80-89 MM HG: CPT | Performed by: INTERNAL MEDICINE

## 2021-12-20 PROCEDURE — 99214 OFFICE O/P EST MOD 30 MIN: CPT | Performed by: INTERNAL MEDICINE

## 2021-12-20 NOTE — PROGRESS NOTES
HPI:    Patient ID: Farrah Portillo is a 61year old male. HPI  Farrah Portillo is here for follow-up. He has hypertension, , hypertriglyceridemia ,Crohn's disease, IFG.   Noted to have 10 pounds weight loss which he states was intentional.  He had reduce     per NG (Harsha Gomez 1998) : \"kidney stent removal\"   • Lipid screening 12-   • Other and unspecified hyperlipidemia    • Status post cystoscopy    • Status post laparoscopic-assisted sigmoidectomy     with takedown of splenic flexure   • Status post Oral Tab Take 5 tablets (250 mg total) by mouth daily 450 tablet 1     Allergies:  Penicillin G            RASH   PHYSICAL EXAM:      12/20/21  0909   BP: 130/80   Pulse:    Temp:       Wt Readings from Last 6 Encounters:  12/20/21 : 242 lb (109.8 kg)  06/ No tenderness. Normal range of motion. Cervical back: Normal range of motion and neck supple. No tenderness. Right lower leg: No edema. Left lower leg: No edema. Lymphadenopathy:      Cervical: No cervical adenopathy.    Skin:     General: intestine without complication (HCC)  Stable. Takes azathioprine    8.  Class 2 severe obesity due to excess calories with serious comorbidity and body mass index (BMI) of 36.0 to 36.9 in adult [facility])  Intentional weight loss     Recommend following a heart

## 2022-01-18 RX ORDER — AZATHIOPRINE 50 MG/1
TABLET ORAL
Qty: 450 TABLET | Refills: 1 | Status: SHIPPED | OUTPATIENT
Start: 2022-01-18

## 2022-01-18 NOTE — TELEPHONE ENCOUNTER
Requested Prescriptions     Pending Prescriptions Disp Refills   • azaTHIOprine 50 MG Oral Tab 450 tablet 1     Sig: Take 5 tablets (250 mg total) by mouth daily     LOV  6/10/2021  LR   10/20/2021    em

## 2022-01-30 ENCOUNTER — PATIENT MESSAGE (OUTPATIENT)
Dept: INTERNAL MEDICINE CLINIC | Facility: CLINIC | Age: 57
End: 2022-01-30

## 2022-01-31 NOTE — TELEPHONE ENCOUNTER
From: Emily Mejia  To: Hamzah Mccann MD  Sent: 1/30/2022 1:21 PM CST  Subject: blood pressure    hello doctor Co  i have been having high blood pressure readings and wondered if you can prescribe too me a stronger medication than what i am currently danni

## 2022-01-31 NOTE — TELEPHONE ENCOUNTER
Patient reporting hypertensive B/P readings of 184/95 x 2-3 days @ pharmacy utilizing automated sphygmomanometer and requesting adjustment in his anti-hypertensive med (Lisinopril 20-25 mg po daily). Unable to reach patient via cell number.  Both voice wolfgang

## 2022-02-20 LAB
AMB EXT BUN: 15 MG/DL
AMB EXT CHOL/HDL RATIO: 3.6
AMB EXT CHOLESTEROL, TOTAL: 120 MG/DL
AMB EXT CMP ALT: 21 U/L
AMB EXT CMP AST: 27 U/L
AMB EXT CREATININE: 1.25 MG/DL
AMB EXT GLUCOSE: 95 MG/DL
AMB EXT HDL CHOLESTEROL: 33 MG/DL
AMB EXT HEMATOCRIT: 47.9
AMB EXT HEMOGLOBIN: 16.5
AMB EXT LDL CHOLESTEROL, DIRECT: 63 MG/DL
AMB EXT PLATELETS: 177
AMB EXT TRIGLYCERIDES: 154 MG/DL
AMB EXT WBC: 6.3 X10(3)UL

## 2022-02-22 ENCOUNTER — OFFICE VISIT (OUTPATIENT)
Dept: INTERNAL MEDICINE CLINIC | Facility: CLINIC | Age: 57
End: 2022-02-22

## 2022-02-22 ENCOUNTER — TELEPHONE (OUTPATIENT)
Dept: GASTROENTEROLOGY | Facility: CLINIC | Age: 57
End: 2022-02-22

## 2022-02-22 VITALS
TEMPERATURE: 98 F | DIASTOLIC BLOOD PRESSURE: 98 MMHG | HEART RATE: 58 BPM | RESPIRATION RATE: 18 BRPM | HEIGHT: 68 IN | OXYGEN SATURATION: 98 % | BODY MASS INDEX: 39.25 KG/M2 | SYSTOLIC BLOOD PRESSURE: 152 MMHG | WEIGHT: 259 LBS

## 2022-02-22 DIAGNOSIS — N18.31 STAGE 3A CHRONIC KIDNEY DISEASE (HCC): ICD-10-CM

## 2022-02-22 DIAGNOSIS — R06.83 SNORES: ICD-10-CM

## 2022-02-22 DIAGNOSIS — I10 ESSENTIAL HYPERTENSION: Primary | ICD-10-CM

## 2022-02-22 PROCEDURE — 3008F BODY MASS INDEX DOCD: CPT | Performed by: INTERNAL MEDICINE

## 2022-02-22 PROCEDURE — 3077F SYST BP >= 140 MM HG: CPT | Performed by: INTERNAL MEDICINE

## 2022-02-22 PROCEDURE — 3080F DIAST BP >= 90 MM HG: CPT | Performed by: INTERNAL MEDICINE

## 2022-02-22 PROCEDURE — 99214 OFFICE O/P EST MOD 30 MIN: CPT | Performed by: INTERNAL MEDICINE

## 2022-02-22 RX ORDER — AMLODIPINE BESYLATE 5 MG/1
5 TABLET ORAL DAILY
Qty: 90 TABLET | Refills: 0 | Status: SHIPPED | OUTPATIENT
Start: 2022-02-22 | End: 2023-02-17

## 2022-02-22 NOTE — TELEPHONE ENCOUNTER
Patient dropped off his [facility] lab results for Dr. Tiana Parra to review. Patient stated he can be reached via [employer] to discuss results. Placed labs results in RN in-box.

## 2022-02-22 NOTE — TELEPHONE ENCOUNTER
Dr. Fabio Salvador-    Patient's lab results-CBC, CMP, lipid panel completed on 2-9-22 placed on desk for review. Please advise if needed. Thank you!

## 2022-02-23 NOTE — TELEPHONE ENCOUNTER
As per KENJI parameters I left a message on the patient's voicemail. His CBC and chemistries are normal.  I would defer the lipid panel results to his primary care physician. I have asked Abiola Castano to contact me with any questions.

## 2022-03-10 RX ORDER — LISINOPRIL AND HYDROCHLOROTHIAZIDE 25; 20 MG/1; MG/1
TABLET ORAL
Qty: 90 TABLET | Refills: 0 | Status: SHIPPED | OUTPATIENT
Start: 2022-03-10 | End: 2022-03-14

## 2022-03-14 RX ORDER — LISINOPRIL AND HYDROCHLOROTHIAZIDE 25; 20 MG/1; MG/1
TABLET ORAL
Qty: 90 TABLET | Refills: 0 | Status: SHIPPED | OUTPATIENT
Start: 2022-03-14

## 2022-04-18 ENCOUNTER — PATIENT MESSAGE (OUTPATIENT)
Dept: DERMATOLOGY CLINIC | Facility: CLINIC | Age: 57
End: 2022-04-18

## 2022-04-18 RX ORDER — AZATHIOPRINE 50 MG/1
TABLET ORAL
Qty: 450 TABLET | Refills: 1 | Status: SHIPPED | OUTPATIENT
Start: 2022-04-18

## 2022-04-18 NOTE — TELEPHONE ENCOUNTER
From: Zaynab Christopher  To: Nichelle Zendejas MD  Sent: 4/18/2022 8:36 AM CDT  Subject: skin exam    hello,do you have any openings in may in early to mid afternoonish monday thru friday? ? the little schedule icon box by your name doesnt illuminate like it does for my other 2 doctors. ....dr gomez wants me to get checked periodically because i take azathioprine and worries about possible skin cancer. ...i last came in in 2020. Janine Watson ...   thanks, [employer]

## 2022-05-17 ENCOUNTER — TELEPHONE (OUTPATIENT)
Dept: INTERNAL MEDICINE CLINIC | Facility: CLINIC | Age: 57
End: 2022-05-17

## 2022-05-17 DIAGNOSIS — I10 ESSENTIAL HYPERTENSION: ICD-10-CM

## 2022-05-17 RX ORDER — LISINOPRIL AND HYDROCHLOROTHIAZIDE 25; 20 MG/1; MG/1
1 TABLET ORAL DAILY
Qty: 30 TABLET | Refills: 0 | Status: SHIPPED | OUTPATIENT
Start: 2022-05-17

## 2022-05-17 RX ORDER — AMLODIPINE BESYLATE 5 MG/1
5 TABLET ORAL DAILY
Qty: 30 TABLET | Refills: 0 | Status: SHIPPED | OUTPATIENT
Start: 2022-05-17 | End: 2023-05-12

## 2022-05-17 NOTE — TELEPHONE ENCOUNTER
Pt came in requesting partial refills for amLODIPine 5 MG Oral Tab  LISINOPRIL-HYDROCHLOROTHIAZIDE 20-25 MG Oral Tab  To please inform him when this has been sent

## 2022-05-18 DIAGNOSIS — I10 ESSENTIAL HYPERTENSION: ICD-10-CM

## 2022-05-18 RX ORDER — AMLODIPINE BESYLATE 5 MG/1
5 TABLET ORAL DAILY
Qty: 30 TABLET | Refills: 0 | OUTPATIENT
Start: 2022-05-18 | End: 2023-05-13

## 2022-05-18 RX ORDER — LISINOPRIL AND HYDROCHLOROTHIAZIDE 25; 20 MG/1; MG/1
1 TABLET ORAL DAILY
Qty: 30 TABLET | Refills: 0 | OUTPATIENT
Start: 2022-05-18

## 2022-06-23 ENCOUNTER — OFFICE VISIT (OUTPATIENT)
Dept: INTERNAL MEDICINE CLINIC | Facility: CLINIC | Age: 57
End: 2022-06-23

## 2022-06-23 VITALS
HEIGHT: 68 IN | RESPIRATION RATE: 17 BRPM | DIASTOLIC BLOOD PRESSURE: 80 MMHG | HEART RATE: 85 BPM | BODY MASS INDEX: 38.65 KG/M2 | OXYGEN SATURATION: 99 % | SYSTOLIC BLOOD PRESSURE: 126 MMHG | WEIGHT: 255 LBS

## 2022-06-23 DIAGNOSIS — I10 ESSENTIAL HYPERTENSION: ICD-10-CM

## 2022-06-23 PROCEDURE — 3008F BODY MASS INDEX DOCD: CPT | Performed by: FAMILY MEDICINE

## 2022-06-23 PROCEDURE — 3074F SYST BP LT 130 MM HG: CPT | Performed by: FAMILY MEDICINE

## 2022-06-23 PROCEDURE — 99203 OFFICE O/P NEW LOW 30 MIN: CPT | Performed by: FAMILY MEDICINE

## 2022-06-23 PROCEDURE — 3079F DIAST BP 80-89 MM HG: CPT | Performed by: FAMILY MEDICINE

## 2022-06-23 RX ORDER — AMLODIPINE BESYLATE 5 MG/1
5 TABLET ORAL DAILY
Qty: 90 TABLET | Refills: 1 | Status: SHIPPED | OUTPATIENT
Start: 2022-06-23 | End: 2023-06-18

## 2022-06-23 RX ORDER — LISINOPRIL AND HYDROCHLOROTHIAZIDE 25; 20 MG/1; MG/1
1 TABLET ORAL DAILY
Qty: 90 TABLET | Refills: 1 | Status: SHIPPED | OUTPATIENT
Start: 2022-06-23

## 2022-07-14 ENCOUNTER — OFFICE VISIT (OUTPATIENT)
Dept: GASTROENTEROLOGY | Facility: CLINIC | Age: 57
End: 2022-07-14

## 2022-07-14 VITALS
SYSTOLIC BLOOD PRESSURE: 132 MMHG | DIASTOLIC BLOOD PRESSURE: 84 MMHG | WEIGHT: 258 LBS | TEMPERATURE: 99 F | HEIGHT: 68 IN | BODY MASS INDEX: 39.1 KG/M2 | HEART RATE: 66 BPM

## 2022-07-14 DIAGNOSIS — K50.80 CROHN'S DISEASE OF BOTH SMALL AND LARGE INTESTINE WITHOUT COMPLICATION (HCC): Primary | ICD-10-CM

## 2022-07-14 RX ORDER — AZATHIOPRINE 50 MG/1
TABLET ORAL
Qty: 450 TABLET | Refills: 1 | Status: SHIPPED | OUTPATIENT
Start: 2022-07-14

## 2022-07-14 NOTE — PATIENT INSTRUCTIONS
1.  Continue current medication. 2.  Follow-up with Dr. Ever Rodriguez as scheduled. 3.  Blood work every 3 months. 4.  You are due for a colonoscopy in July 2024.  5.  Follow-up office visit in 1 year or sooner if issues arise.

## 2022-07-18 ENCOUNTER — TELEPHONE (OUTPATIENT)
Dept: GASTROENTEROLOGY | Facility: CLINIC | Age: 57
End: 2022-07-18

## 2022-07-18 DIAGNOSIS — K50.80 CROHN'S DISEASE OF BOTH SMALL AND LARGE INTESTINE WITHOUT COMPLICATION (HCC): Primary | ICD-10-CM

## 2022-07-18 NOTE — TELEPHONE ENCOUNTER
Dr. Maurice Espinoza-    Patient had results from completed blood work performed on 6/29/22 faxed to GI office. Results placed on desk for review if needed.

## 2022-07-20 NOTE — TELEPHONE ENCOUNTER
I spoke to Eli. We discussed his recent blood test results. His creatinine has increased slightly from 1.25-1. 32. Potassium is 3.3. CBC and liver chemistries are acceptable. His blood pressure at his recent visit was normal.  I recommended that he push p.o. fluids, increase potassium intake and repeat labs in 3 months. He will also establish care with a new primary care physician. Orders for a CMP and CBC entered.

## 2022-10-07 ENCOUNTER — TELEPHONE (OUTPATIENT)
Dept: GASTROENTEROLOGY | Facility: CLINIC | Age: 57
End: 2022-10-07

## 2022-10-07 NOTE — TELEPHONE ENCOUNTER
I left a message on the patient's voicemail. His laboratory testing is stable. His WBC is normal at 6.7. Hemoglobin is 17.5 with a normal RBC count. Platelets are normal.  Triglycerides are elevated at 246. I have asked the patient to follow-up with Dr. Reese Izquierdo in this regard. From my standpoint I would continue current medications and repeat laboratory testing in 3 months. I have asked Enrique Nunez to contact me if he has any questions.

## 2022-10-07 NOTE — TELEPHONE ENCOUNTER
Received test results for Lipid panel, CMP & CBC from [facility] dated 10/03/2022    Placed on Denisha Francis' desk for review    Copy of results sent to scanning

## 2022-12-13 ENCOUNTER — OFFICE VISIT (OUTPATIENT)
Dept: INTERNAL MEDICINE CLINIC | Facility: CLINIC | Age: 57
End: 2022-12-13

## 2022-12-13 VITALS
DIASTOLIC BLOOD PRESSURE: 82 MMHG | SYSTOLIC BLOOD PRESSURE: 122 MMHG | BODY MASS INDEX: 38.95 KG/M2 | HEIGHT: 68 IN | TEMPERATURE: 97 F | HEART RATE: 66 BPM | WEIGHT: 257 LBS | RESPIRATION RATE: 17 BRPM | OXYGEN SATURATION: 98 %

## 2022-12-13 DIAGNOSIS — I10 PRIMARY HYPERTENSION: ICD-10-CM

## 2022-12-13 DIAGNOSIS — K50.919 CROHN'S DISEASE WITH COMPLICATION, UNSPECIFIED GASTROINTESTINAL TRACT LOCATION (HCC): ICD-10-CM

## 2022-12-13 DIAGNOSIS — D75.1 ERYTHROCYTOSIS: ICD-10-CM

## 2022-12-13 DIAGNOSIS — Z00.00 HEALTH MAINTENANCE EXAMINATION: Primary | ICD-10-CM

## 2022-12-13 DIAGNOSIS — E66.01 CLASS 2 SEVERE OBESITY DUE TO EXCESS CALORIES WITH SERIOUS COMORBIDITY AND BODY MASS INDEX (BMI) OF 39.0 TO 39.9 IN ADULT (HCC): ICD-10-CM

## 2022-12-13 DIAGNOSIS — E78.1 HYPERTRIGLYCERIDEMIA: ICD-10-CM

## 2022-12-13 DIAGNOSIS — D18.01 CHERRY ANGIOMA: ICD-10-CM

## 2022-12-13 DIAGNOSIS — L82.1 SEBORRHEIC KERATOSIS: ICD-10-CM

## 2022-12-13 DIAGNOSIS — N40.0 HYPERPLASIA OF PROSTATE WITHOUT LOWER URINARY TRACT SYMPTOMS (LUTS): ICD-10-CM

## 2022-12-13 PROBLEM — L81.4 LENTIGO: Status: RESOLVED | Noted: 2021-03-24 | Resolved: 2022-12-13

## 2022-12-13 PROCEDURE — 99214 OFFICE O/P EST MOD 30 MIN: CPT | Performed by: FAMILY MEDICINE

## 2022-12-13 RX ORDER — LISINOPRIL AND HYDROCHLOROTHIAZIDE 25; 20 MG/1; MG/1
1 TABLET ORAL DAILY
Qty: 90 TABLET | Refills: 1 | Status: SHIPPED | OUTPATIENT
Start: 2022-12-13 | End: 2022-12-15

## 2022-12-13 RX ORDER — AMLODIPINE BESYLATE 5 MG/1
5 TABLET ORAL DAILY
Qty: 90 TABLET | Refills: 1 | Status: SHIPPED | OUTPATIENT
Start: 2022-12-13 | End: 2022-12-15

## 2022-12-13 NOTE — PATIENT INSTRUCTIONS
PATIENT INSTRUCTIONS    Thank you for seeing me today, it was a pleasure taking care of you. Please check out at the  and schedule a follow up appointment.   Return in about 2 weeks (around 12/27/2022) for physical.  Blood work today, will review during your physical  Continue all your medications at this time  Continue to see GI    Higin[place],  Dr. Carrie Loya

## 2022-12-13 NOTE — ASSESSMENT & PLAN NOTE
Needs to work on healthy diet and exercise which we will discuss more extensively during future office visits.

## 2022-12-13 NOTE — ASSESSMENT & PLAN NOTE
Blood pressure well controlled in the office today. Continue lisinopril-hydrochlorothiazide 20-25 mg daily. Continue amlodipine 5 mg daily.   Consider getting baseline EKG during future office visit with me

## 2022-12-15 DIAGNOSIS — I10 PRIMARY HYPERTENSION: ICD-10-CM

## 2022-12-15 PROBLEM — D58.2 ELEVATED HEMOGLOBIN (HCC): Status: ACTIVE | Noted: 2021-03-23

## 2022-12-15 PROBLEM — D58.2 ELEVATED HEMOGLOBIN: Status: ACTIVE | Noted: 2021-03-23

## 2022-12-15 LAB
ABSOLUTE BASOPHILS: 47 CELLS/UL (ref 0–200)
ABSOLUTE EOSINOPHILS: 101 CELLS/UL (ref 15–500)
ABSOLUTE LYMPHOCYTES: 1106 CELLS/UL (ref 850–3900)
ABSOLUTE MONOCYTES: 509 CELLS/UL (ref 200–950)
ABSOLUTE NEUTROPHILS: 4938 CELLS/UL (ref 1500–7800)
ALBUMIN/GLOBULIN RATIO: 1.4 (CALC) (ref 1–2.5)
ALBUMIN: 4.4 G/DL (ref 3.6–5.1)
ALKALINE PHOSPHATASE: 52 U/L (ref 35–144)
ALT: 30 U/L (ref 9–46)
AST: 34 U/L (ref 10–35)
BASOPHILS: 0.7 %
BILIRUBIN, TOTAL: 0.6 MG/DL (ref 0.2–1.2)
BUN: 17 MG/DL (ref 7–25)
CALCIUM: 9.7 MG/DL (ref 8.6–10.3)
CARBON DIOXIDE: 33 MMOL/L (ref 20–32)
CHLORIDE: 100 MMOL/L (ref 98–110)
CHOL/HDLC RATIO: 4.6 (CALC)
CHOLESTEROL, TOTAL: 130 MG/DL
CREATININE: 1.27 MG/DL (ref 0.7–1.3)
EGFR: 65 ML/MIN/1.73M2
EOSINOPHILS: 1.5 %
GLOBULIN: 3.2 G/DL (CALC) (ref 1.9–3.7)
GLUCOSE: 112 MG/DL (ref 65–99)
HDL CHOLESTEROL: 28 MG/DL
HEMATOCRIT: 48.3 % (ref 38.5–50)
HEMOGLOBIN A1C: 5.5 % OF TOTAL HGB
HEMOGLOBIN: 17.2 G/DL (ref 13.2–17.1)
LDL-CHOLESTEROL: 67 MG/DL (CALC)
LYMPHOCYTES: 16.5 %
MCH: 30.9 PG (ref 27–33)
MCHC: 35.6 G/DL (ref 32–36)
MCV: 86.9 FL (ref 80–100)
MONOCYTES: 7.6 %
MPV: 11.7 FL (ref 7.5–12.5)
NEUTROPHILS: 73.7 %
NON-HDL CHOLESTEROL: 102 MG/DL (CALC)
PLATELET COUNT: 241 THOUSAND/UL (ref 140–400)
POTASSIUM: 3.7 MMOL/L (ref 3.5–5.3)
PROTEIN, TOTAL: 7.6 G/DL (ref 6.1–8.1)
RDW: 14.6 % (ref 11–15)
RED BLOOD CELL COUNT: 5.56 MILLION/UL (ref 4.2–5.8)
SODIUM: 141 MMOL/L (ref 135–146)
T4, FREE: 1.6 NG/DL (ref 0.8–1.8)
TRIGLYCERIDES: 269 MG/DL
TSH W/REFLEX TO FT4: 0.38 MIU/L (ref 0.4–4.5)
WHITE BLOOD CELL COUNT: 6.7 THOUSAND/UL (ref 3.8–10.8)

## 2022-12-15 RX ORDER — LISINOPRIL AND HYDROCHLOROTHIAZIDE 25; 20 MG/1; MG/1
1 TABLET ORAL DAILY
Qty: 90 TABLET | Refills: 1 | Status: SHIPPED | OUTPATIENT
Start: 2022-12-15

## 2022-12-15 RX ORDER — AMLODIPINE BESYLATE 5 MG/1
5 TABLET ORAL DAILY
Qty: 90 TABLET | Refills: 1 | Status: SHIPPED | OUTPATIENT
Start: 2022-12-15

## 2022-12-16 RX ORDER — AZATHIOPRINE 50 MG/1
TABLET ORAL
Qty: 450 TABLET | Refills: 1 | Status: SHIPPED | OUTPATIENT
Start: 2022-12-16

## 2022-12-19 RX ORDER — AZATHIOPRINE 50 MG/1
TABLET ORAL
Qty: 450 TABLET | Refills: 1 | OUTPATIENT
Start: 2022-12-19

## 2023-01-03 ENCOUNTER — OFFICE VISIT (OUTPATIENT)
Dept: INTERNAL MEDICINE CLINIC | Facility: CLINIC | Age: 58
End: 2023-01-03

## 2023-01-03 VITALS
WEIGHT: 258 LBS | OXYGEN SATURATION: 98 % | HEART RATE: 69 BPM | BODY MASS INDEX: 39.1 KG/M2 | HEIGHT: 68 IN | RESPIRATION RATE: 16 BRPM | TEMPERATURE: 97 F | DIASTOLIC BLOOD PRESSURE: 82 MMHG | SYSTOLIC BLOOD PRESSURE: 138 MMHG

## 2023-01-03 DIAGNOSIS — R06.83 SNORING: ICD-10-CM

## 2023-01-03 DIAGNOSIS — L82.1 SEBORRHEIC KERATOSIS: ICD-10-CM

## 2023-01-03 DIAGNOSIS — K50.919 CROHN'S DISEASE WITH COMPLICATION, UNSPECIFIED GASTROINTESTINAL TRACT LOCATION (HCC): ICD-10-CM

## 2023-01-03 DIAGNOSIS — E66.01 CLASS 2 SEVERE OBESITY DUE TO EXCESS CALORIES WITH SERIOUS COMORBIDITY AND BODY MASS INDEX (BMI) OF 39.0 TO 39.9 IN ADULT (HCC): ICD-10-CM

## 2023-01-03 DIAGNOSIS — D18.01 CHERRY ANGIOMA: ICD-10-CM

## 2023-01-03 DIAGNOSIS — Z00.00 HEALTH MAINTENANCE EXAMINATION: Primary | ICD-10-CM

## 2023-01-03 DIAGNOSIS — N40.0 HYPERPLASIA OF PROSTATE WITHOUT LOWER URINARY TRACT SYMPTOMS (LUTS): ICD-10-CM

## 2023-01-03 DIAGNOSIS — E78.1 HYPERTRIGLYCERIDEMIA: ICD-10-CM

## 2023-01-03 DIAGNOSIS — D58.2 ELEVATED HEMOGLOBIN (HCC): ICD-10-CM

## 2023-01-03 DIAGNOSIS — I10 PRIMARY HYPERTENSION: ICD-10-CM

## 2023-01-03 PROCEDURE — 99396 PREV VISIT EST AGE 40-64: CPT | Performed by: FAMILY MEDICINE

## 2023-01-03 NOTE — PATIENT INSTRUCTIONS
PATIENT INSTRUCTIONS    Thank you for seeing me today, it was a pleasure taking care of you. Please check out at the  and schedule a follow up appointment. Return in about 1 year (around 1/3/2024) for physical.  The following imaging studies were ordered: CT calcium score - evaluate your cholesterol and heart  Please also follow up with the following specialists: GI, pulmonology - sleep evaluation   Please fill out the advance directive information (power of  documents) and bring a copy to our clinic. Portion control - monitor how much you are eating and try to cut back. Do what you can to make healthy choices (as an example, choose water over soda). You can try apps such a MyFitnessPal or Weight Watchers to help you monitor your diet and exercise habits. Exercise - increase your intensity level slowly if you do not exercise regularly. Ideally your goal should eventually be to be moving at a pace where it is hard to have a conversation with someone.   SAMY Sylvester,   Dr. Geoffrey Jackson

## 2023-01-03 NOTE — ASSESSMENT & PLAN NOTE
Exercise and diet advised. Labs reviewed with patient in the office today. Tdap declined. Flu shot declined. Shingles vaccine today  Advanced directive information provided. Advised COVID vaccine.

## 2023-01-03 NOTE — ASSESSMENT & PLAN NOTE
Healthy diet and exercise advised. Does have mildly elevated ASCVD risk score. Will check CT calcium score.

## 2023-06-30 ENCOUNTER — OFFICE VISIT (OUTPATIENT)
Dept: DERMATOLOGY CLINIC | Facility: CLINIC | Age: 58
End: 2023-06-30

## 2023-06-30 DIAGNOSIS — Z79.624 ENCOUNTER FOR LONG TERM CURRENT USE OF AZATHIOPRINE: ICD-10-CM

## 2023-06-30 DIAGNOSIS — D23.4 BENIGN NEOPLASM OF SCALP AND SKIN OF NECK: ICD-10-CM

## 2023-06-30 DIAGNOSIS — D23.70 BENIGN NEOPLASM OF SKIN OF LOWER LIMB, INCLUDING HIP, UNSPECIFIED LATERALITY: ICD-10-CM

## 2023-06-30 DIAGNOSIS — D22.9 MULTIPLE NEVI: ICD-10-CM

## 2023-06-30 DIAGNOSIS — D23.5 BENIGN NEOPLASM OF SKIN OF TRUNK: ICD-10-CM

## 2023-06-30 DIAGNOSIS — L82.1 SK (SEBORRHEIC KERATOSIS): ICD-10-CM

## 2023-06-30 DIAGNOSIS — D23.30 BENIGN NEOPLASM OF SKIN OF FACE: ICD-10-CM

## 2023-06-30 DIAGNOSIS — L81.4 LENTIGO: ICD-10-CM

## 2023-06-30 DIAGNOSIS — D23.60 BENIGN NEOPLASM OF SKIN OF UPPER LIMB, INCLUDING SHOULDER, UNSPECIFIED LATERALITY: ICD-10-CM

## 2023-06-30 DIAGNOSIS — L82.1 VERRUCOUS KERATOSIS: Primary | ICD-10-CM

## 2023-06-30 PROCEDURE — 99214 OFFICE O/P EST MOD 30 MIN: CPT | Performed by: DERMATOLOGY

## 2023-06-30 RX ORDER — ADAPALENE 45 G/G
GEL TOPICAL NIGHTLY
Refills: 0 | COMMUNITY
Start: 2023-06-30

## 2023-07-09 NOTE — PROGRESS NOTES
Yamilex Malik is a 61year old male. HPI:     CC:  Patient presents with:  Full Skin Exam: LOV 07/06/20- Pt presents for a Full Body Skin Exam. Pt has a lesion of concern on the Right outer lower arm x 1 year. Pt denies any S/S, however it looks like a scab that won't heal.   Pt denies a personal or family Hx of skin ca. Pt is taking azaTHIOprine 50 MG Oral Tab and recommended to do annual skin checks due to risk for skin ca development.           Allergies:  Penicillin G    HISTORY:    Past Medical History:   Diagnosis Date    Crohn's disease ([facility] [address] 03/2000    Hyperplasia of prostate without lower urinary tract symptoms (LUTS) 11/16/2013    Hypertriglyceridemia 08/05/2013    Primary hypertension 12/14/2007      Past Surgical History:   Procedure Laterality Date    APPENDECTOMY  01/01/2013    BOWEL RESECTION  2013    Small bowel resection    COLECTOMY  01/01/2013    with takedown of splenic flexure    COLONOSCOPY      COLONOSCOPY N/A 07/23/2019    Procedure: COLONOSCOPY;  Surgeon: Pari Mccarthy MD;  Location: [address] [place] [address] ENDOSCOPY    OTHER SURGICAL HISTORY      Cystoscopy      Family History   Problem Relation Age of Onset    Hypertension Mother     Diabetes Mother     Endocrine Disorder Mother         Zollinger-carrasco syndrome    Hypertension Father     Heart Attack Father         MI    No Known Problems Sister     No Known Problems Sister     Crohn's Disease Sister     No Known Problems Brother     No Known Problems Brother     Other (MVA) Brother     No Known Problems Maternal Grandmother     No Known Problems Maternal Grandfather     Obesity Paternal Grandmother     Hypertension Paternal Grandmother     No Known Problems Paternal Grandfather     Colon Cancer Neg     Prostate Cancer Neg       Social History     Socioeconomic History    Marital status: Single   Tobacco Use    Smoking status: Former     Packs/day: 0.10     Years: 5.00     Pack years: 0.50     Types: Cigarettes     Quit date: 8/21/2000     Years since quittin.8    Smokeless tobacco: Never   Vaping Use    Vaping Use: Never used   Substance and Sexual Activity    Alcohol use: Yes     Alcohol/week: 1.0 standard drink of alcohol     Types: 1 Cans of beer per week     Comment: occasional beer    Drug use: No    Sexual activity: Not Currently     Partners: Female   Other Topics Concern    Caffeine Concern No    Stress Concern No    Weight Concern Yes    Special Diet No    Exercise Yes    Seat Belt Yes    Pt has a pacemaker No    Pt has a defibrillator No    Reaction to local anesthetic No   Social History Narrative    Relationships: Single. Lives with brother Antonino Simons. Children: None    Pets: 2 cats. School: N/A    Work: None. Used to work for [employer].     Origin: Grew up in [facility]. Interests: Enjoys watching sports - Cubs and [place], riding bike, going for walks. Spiritual: Not Christianity, not spiritual        Current Outpatient Medications   Medication Sig Dispense Refill    Adapalene (DIFFERIN) 0.1 % External Gel Apply topically nightly.  0    azaTHIOprine 50 MG Oral Tab Take 5 tablets (250 mg total) by mouth daily 450 tablet 1    amLODIPine 5 MG Oral Tab Take 1 tablet (5 mg total) by mouth daily. 90 tablet 1    lisinopril-hydroCHLOROthiazide 20-25 MG Oral Tab Take 1 tablet by mouth daily.  90 tablet 1     Allergies:     Penicillin G            RASH    Past Medical History:   Diagnosis Date    Crohn's disease ([facility] [address] 2000    Hyperplasia of prostate without lower urinary tract symptoms (LUTS) 2013    Hypertriglyceridemia 2013    Primary hypertension 2007     Past Surgical History:   Procedure Laterality Date    APPENDECTOMY  2013    BOWEL RESECTION      Small bowel resection    COLECTOMY  2013    with takedown of splenic flexure    COLONOSCOPY      COLONOSCOPY N/A 2019    Procedure: COLONOSCOPY;  Surgeon: Janusz Rios MD;  Location: [facility] ENDOSCOPY    OTHER SURGICAL HISTORY      Cystoscopy     Social History    Socioeconomic History      Marital status: Single      Spouse name: Not on file      Number of children: Not on file      Years of education: Not on file      Highest education level: Not on file    Occupational History      Not on file    Tobacco Use      Smoking status: Former        Packs/day: 0.10        Years: 5.00        Pack years: .5        Types: Cigarettes        Quit date: 2000        Years since quittin.8      Smokeless tobacco: Never    Vaping Use      Vaping Use: Never used    Substance and Sexual Activity      Alcohol use: Yes        Alcohol/week: 1.0 standard drink of alcohol        Types: 1 Cans of beer per week        Comment: occasional beer      Drug use: No      Sexual activity: Not Currently        Partners: Female    Other Topics      Concerns:         Service: Not Asked        Blood Transfusions: Not Asked        Caffeine Concern: No        Occupational Exposure: Not Asked        Hobby Hazards: Not Asked        Sleep Concern: Not Asked        Stress Concern: No        Weight Concern: Yes        Special Diet: No        Back Care: Not Asked        Exercise: Yes        Bike Helmet: Not Asked        Seat Belt: Yes        Self-Exams: Not Asked        Grew up on a farm: Not Asked        History of tanning: Not Asked        Outdoor occupation: Not Asked        Pt has a pacemaker: No        Pt has a defibrillator: No        Reaction to local anesthetic: No    Social History Narrative      Relationships: Single. Lives with brother Demetrio Brown. Children: None      Pets: 2 cats. School: N/A      Work: None. Used to work for [employer].       Origin: Grew up in [place]. Interests: Enjoys watching sports - Cubs and [place], riding bike, going for walks.        Spiritual: Not Jainism, not spiritual    Social Determinants of Health  Financial Resource Strain: Not on file  Food Insecurity: Not on file  Transportation Needs: Not on file  Physical Activity: Not on file  Stress: Not on file  Social Connections: Not on file  Housing Stability: Not on file    Family History   Problem Relation Age of Onset    Hypertension Mother     Diabetes Mother     Endocrine Disorder Mother         Zollinger-carrasco syndrome    Hypertension Father     Heart Attack Father         MI    No Known Problems Sister     No Known Problems Sister     Crohn's Disease Sister     No Known Problems Brother     No Known Problems Brother     Other (MVA) Brother     No Known Problems Maternal Grandmother     No Known Problems Maternal Grandfather     Obesity Paternal Grandmother     Hypertension Paternal Grandmother     No Known Problems Paternal Grandfather     Colon Cancer Neg     Prostate Cancer Neg        There were no vitals filed for this visit. HPI:    Patient presents with:  Full Skin Exam: LOV 07/06/20- Pt presents for a Full Body Skin Exam. Pt has a lesion of concern on the Right outer lower arm x 1 year. Pt denies any S/S, however it looks like a scab that won't heal.   Pt denies a personal or family Hx of skin ca. Pt is taking azaTHIOprine 50 MG Oral Tab and recommended to do annual skin checks due to risk for skin ca development. Follow-up skin check patient concerned with lesion right arm crusted patient on azathioprine long-term. Has generally been cautious with sun protection. No past history of skin cancer. No personal or family history of skin cancer    Patient presents with concerns above. Past notes/ records and appropriate/relevant lab results including pathology and past body maps reviewed. Updated and new information noted in current visit. Patient has been in their usual state of health. History, medications, allergies reviewed as noted. ROS:  Denies any other systemic complaints. No new or changeing lesions other than noted above. No fevers, chills, night sweats, unusual sun sensitivity. No other skin complaints. History, medications, allergies reviewed as noted. Physical Examination:     Well-developed well-nourished patient alert oriented in no acute distress. Exam total-body performed, including scalp, head, neck, face,nails, hair, external eyes, including conjunctival mucosa, eyelids, lips external ears, back, chest,/ breasts, axillae,  abdomen, arms, legs, palms. Multiple light to medium brown, well marginated, uniformly pigmented, macules and papules 6 mm and less scattered on exam. pigmented lesions examined with dermoscopy benign-appearing patterns. Waxy tannish keratotic papules scattered, cherry-red vascular papules scattered. See map today's date for lesions noted . Otherwise remarkable for lesions as noted on map. See details of examination  See Assessment /Plan for additional history and physical exam also:    Assessment / plan:    No orders of the defined types were placed in this encounter. Meds & Refills for this Visit:  Requested Prescriptions      No prescriptions requested or ordered in this encounter         Verrucous keratosis  (primary encounter diagnosis)  Sk (seborrheic keratosis)  Lentigo  Multiple nevi  Benign neoplasm of scalp and skin of neck  Benign neoplasm of skin of face  Benign neoplasm of skin of upper limb, including shoulder, unspecified laterality  Benign neoplasm of skin of trunk  Benign neoplasm of skin of lower limb, including hip, unspecified laterality  Encounter for long term current use of azathioprine    See details on map. Remarkable for:    Verrucoid keratotic plaque 5 mm at right lateral dorsal wrist.  Lesions treated with cryo- . Medically necessary as lesion inflamed and irritated. Biopsy / reevaluation if not resolved. Continue careful monitoring follow-up with azathioprine long-term. Sun damage comedones at lateral orbit Favre-Racouchot Differin gel more careful sun protection.   Consider tretinoin. No active AK's    Cystic lesion scalp observe. Stable multiple nature angiomas reassurance. Keratoses back chest arms reassurance. No significant changes in pigmented lesions    Erythematous papule lumbar back observe. Irregular patch at the left forearm likely larger lentigines unchanged  4 larger lentigo observed. Overall stable    No atypical pigmented lesions. Encouraged sunscreen annual follow-up   Benign keratosis left hand observe. Pinkish patch right scapula observed benign-appearing nevus. Please refer to map for specific lesions. See additional diagnoses. Pros cons of various therapies, risks benefits discussed. Pathophysiology discussed with patient. Therapeutic options reviewed. See  Medications in grid. Instructions reviewed at length. Benign nevi, seborrheic  keratoses, cherry angiomas:  Reassurance regarding other benign skin lesions. Signs and symptoms of skin cancer, ABCDE's of melanoma discussed with patient. Sunscreen use, sun protection, self exams reviewed. Followup as noted RTC routine checkup 6 mos - one year or p.r.n. The patient indicates understanding of these issues and agrees to the plan. The patient is asked to return as noted in follow-up/ above. This note was generated using Dragon voice recognition software. Please contact me regarding any confusion resulting from errors in recognition. Note to patient and family: The [facility] makes medical notes like these available to patients. However, be advised this is a medical document. It is intended as [id number] communication and monitoring of a patient's care needs. It is written in medical language and may contain abbreviations or verbiage that are unfamiliar. It may appear blunt or direct. Medical documents are intended to carry relevant information, facts as evident and the clinical opinion of the practitioner.

## 2023-07-10 ENCOUNTER — TELEPHONE (OUTPATIENT)
Facility: CLINIC | Age: 58
End: 2023-07-10

## 2023-07-11 NOTE — TELEPHONE ENCOUNTER
As per KENJI parameters I left a message on the patient's home voicemail. His laboratory testing was normal.  We will further discuss at his office visit tomorrow.

## 2023-07-12 ENCOUNTER — OFFICE VISIT (OUTPATIENT)
Facility: CLINIC | Age: 58
End: 2023-07-12

## 2023-07-12 VITALS
SYSTOLIC BLOOD PRESSURE: 145 MMHG | WEIGHT: 237.13 LBS | HEIGHT: 68 IN | DIASTOLIC BLOOD PRESSURE: 87 MMHG | HEART RATE: 53 BPM | BODY MASS INDEX: 35.94 KG/M2

## 2023-07-12 DIAGNOSIS — K50.80 CROHN'S DISEASE OF BOTH SMALL AND LARGE INTESTINE WITHOUT COMPLICATION (HCC): Primary | ICD-10-CM

## 2023-07-12 PROCEDURE — 99213 OFFICE O/P EST LOW 20 MIN: CPT | Performed by: INTERNAL MEDICINE

## 2023-07-12 NOTE — PATIENT INSTRUCTIONS
1.  Continue azathioprine at current dose. 2.  Repeat blood work in 3 months. 3.  Follow-up office visit in the summer 2024  4. Colonoscopy in October 2024. 5.  Please contact me sooner with any changes.

## 2023-07-13 RX ORDER — AZATHIOPRINE 50 MG/1
TABLET ORAL
Qty: 450 TABLET | Refills: 1 | Status: SHIPPED | OUTPATIENT
Start: 2023-07-13

## 2023-07-13 NOTE — TELEPHONE ENCOUNTER
Requested Prescriptions     Pending Prescriptions Disp Refills    azaTHIOprine 50 MG Oral Tab [Pharmacy Med Name: azaTHIOprine Oral Tablet 50 MG] 450 tablet 0     Sig: TAKE 5 TABLETS BY MOUTH DAILY     LOV: 07/12/2023  Last Refill: 12/16/2022

## 2023-07-14 NOTE — PROGRESS NOTES
Subjective:   Patient ID: Neeru Olmedo is a 61year old male. HPI  Faye Jones returns in follow-up. He was last seen in July 2022. As per previous Faye Jones has a history of Crohn's disease dating back approximately 23 years (2000). 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 He received bridging Remicade therapy followed by maintenance azathioprine. He had been in remission for many years and stopped azathioprine on his own accord in 2008. In 2013 the patient presented with abdominal pain and weight loss. He was found to have recurrent Crohn's disease with an inflammatory process involving the sigmoid along with hydronephrosis. Despite initial treatment he developed recurrent abscesses that were drained percutaneously. In July 2013 he underwent a laparoscopic sigmoid colon resection, takedown of the fistula from the sigmoid to the skin/retroperitoneum and a resection of a limited portion of the terminal ileum where there was fistulous involvement as well. Maintenance azathioprine was promptly started post surgery with a therapeutic 6-TG level. The patient underwent an MR enterography in May 2014 which revealed a focal area of enhancement in the terminal ileum. It was difficult to ascertain whether this was related to the anastomosis or active Crohn's disease. A colonoscopy on August 6, 2014 revealed active Crohn's ileitis at the ileoileal anastomosis with ulcerations. The colocolonic anastomosis and remainder of the colon were normal with the exception of a focal area of purulence at the anastomosis. Escalating therapy and adding a biologic has been discussed, however,  Faye Jones wished to continue current therapy. Pierce's last colonoscopy in July 2019 revealed focal ileitis and a widely patent ileo-ileal and colocolonic anastomoses. Current history:  Faye Jones has been really good\". He has embarked on a voluntary weight loss down from #258 to #237. He would like to get down to #210-#220.   He denies dysphagia, odynophagia, heartburn or abdominal pain. He may have anywhere from 0-3 stools daily that can be solid. He has noted no bleeding. He recently followed up with dermatology. His subjective wellbeing is excellent. History/Other:   Review of Systems  See above    Wt Readings from Last 7 Encounters:  07/12/23 : 237 lb 1.6 oz (107.5 kg)  01/03/23 : 258 lb (117 kg)  12/13/22 : 257 lb (116.6 kg)  07/14/22 : 258 lb (117 kg)  06/23/22 : 255 lb (115.7 kg)  02/22/22 : 259 lb (117.5 kg)  12/20/21 : 242 lb (109.8 kg)      Current Outpatient Medications   Medication Sig Dispense Refill    Adapalene (DIFFERIN) 0.1 % External Gel Apply topically nightly. 0    amLODIPine 5 MG Oral Tab Take 1 tablet (5 mg total) by mouth daily. 90 tablet 1    lisinopril-hydroCHLOROthiazide 20-25 MG Oral Tab Take 1 tablet by mouth daily. 90 tablet 1    azaTHIOprine 50 MG Oral Tab TAKE 5 TABLETS BY MOUTH DAILY 450 tablet 1     Allergies:  Penicillin G            RASH    Objective:   Physical Exam  Vitals and nursing note reviewed. Constitutional:       General: He is not in acute distress. Appearance: He is well-developed. He is not ill-appearing, toxic-appearing or diaphoretic. Comments: Looks much healthier following his weight loss   HENT:      Mouth/Throat:      Pharynx: No oropharyngeal exudate. Eyes:      General: No scleral icterus. Conjunctiva/sclera: Conjunctivae normal.   Neck:      Thyroid: No thyromegaly. Cardiovascular:      Rate and Rhythm: Normal rate and regular rhythm. Heart sounds: Normal heart sounds. No murmur heard. Pulmonary:      Effort: Pulmonary effort is normal. No respiratory distress. Breath sounds: Normal breath sounds. No wheezing or rales. Abdominal:      General: Bowel sounds are normal. There is no distension. Palpations: Abdomen is soft. There is no mass. Tenderness: There is no abdominal tenderness. There is no guarding or rebound.       Hernia: No hernia is present. Musculoskeletal:      Cervical back: Neck supple. Lymphadenopathy:      Cervical: No cervical adenopathy. Neurological:      Mental Status: He is alert and oriented to person, place, and time. Psychiatric:         Behavior: Behavior normal.       Laboratory testin/10/2023  WBC: 4.9  Hgb: 16.7  HCT: 48.8  MCV 91.9  Platelets: 100,675  Alkaline phosphatase: 56  AST: 29  ALT: 24  Bilirubin: 1.3  Albumin: 4.7  Globulins: 2.9      Assessment & Plan:   Crohn's disease of both small and large intestine without complication ([facility] [address]  (primary encounter diagnosis)  Abiola Castano has a history of complex Crohn's disease as outlined above. He has undergone a limited ileal resection and takedown of a complex fistula. He is asymptomatic on azathioprine. I am recommending that we continue current therapy. The patient's weight loss is noted and we may need to dose reduce the azathioprine depending on the patient's weight and subsequent levels. He will continue to monitor his weight. Laboratory testing in 3 months. I will check a 6-TG level at that time. Abiola Castano will follow-up in the office next year. He knows that he is due for a colonoscopy in 2024. He will continue scheduled dermatology visits. He will contact me sooner with any changes.       Meds This Visit:  Requested Prescriptions      No prescriptions requested or ordered in this encounter       Imaging & Referrals:  None

## 2023-07-17 ENCOUNTER — TELEPHONE (OUTPATIENT)
Facility: CLINIC | Age: 58
End: 2023-07-17

## 2023-12-15 ENCOUNTER — TELEPHONE (OUTPATIENT)
Facility: CLINIC | Age: 58
End: 2023-12-15

## 2023-12-15 DIAGNOSIS — K50.80 CROHN'S DISEASE OF BOTH SMALL AND LARGE INTESTINE WITHOUT COMPLICATION (HCC): Primary | ICD-10-CM

## 2023-12-15 NOTE — TELEPHONE ENCOUNTER
Dr Bart Cisse    Received fax from [id number] on 12/15/2023 for blood work completed on 10/2/2023. Letter sent for scanning.     Thank you

## 2023-12-15 NOTE — TELEPHONE ENCOUNTER
As per KENJI parameters I left a message on the patient's mobile voicemail. His CBC and liver chemistries are acceptable on azathioprine. The total bilirubin of 1.4 is likely of no consequence. I have recommended that [place] [facility] repeat blood work in 3 months. He was asked to contact me with any questions or symptoms.

## 2023-12-27 DIAGNOSIS — I10 PRIMARY HYPERTENSION: ICD-10-CM

## 2023-12-27 RX ORDER — LISINOPRIL AND HYDROCHLOROTHIAZIDE 25; 20 MG/1; MG/1
1 TABLET ORAL DAILY
Qty: 90 TABLET | Refills: 0 | Status: SHIPPED | OUTPATIENT
Start: 2023-12-27

## 2023-12-27 RX ORDER — AMLODIPINE BESYLATE 5 MG/1
5 TABLET ORAL DAILY
Qty: 90 TABLET | Refills: 0 | Status: SHIPPED | OUTPATIENT
Start: 2023-12-27

## 2023-12-27 NOTE — TELEPHONE ENCOUNTER
Future Appointment:None      Last Appointment:1/3/23      Last Refill:12/15/22      Medication Requested:   Requested Prescriptions     Pending Prescriptions Disp Refills    AMLODIPINE 5 MG Oral Tab [Pharmacy Med Name: amLODIPine Besylate Oral Tablet 5 MG] 90 tablet 0     Sig: TAKE 1 TABLET BY MOUTH DAILY    LISINOPRIL-HYDROCHLOROTHIAZIDE 20-25 MG Oral Tab [Pharmacy Med Name: Lisinopril-hydroCHLOROthiazide Oral Tablet 20-25 MG] 90 tablet 0     Sig: TAKE 1 TABLET BY MOUTH EVERY DAY       Protocol :     Hypertension Medications Protocol [id number]/27/2023 11:35 AM   Protocol Details CMP or BMP in past 12 months    Appointment in past 6 or next 3 months    Last serum creatinine< 2.0       Hypertension Medications Protocol [id number]/27/2023 11:35 AM   Protocol Details CMP or BMP in past 12 months    Appointment in past 6 or next 3 months    Last serum creatinine< 2.0

## 2023-12-28 RX ORDER — AZATHIOPRINE 50 MG/1
TABLET ORAL
Qty: 450 TABLET | Refills: 1 | Status: SHIPPED | OUTPATIENT
Start: 2023-12-28

## 2023-12-28 NOTE — TELEPHONE ENCOUNTER
I called and left a voicemail to explain to the patient he is due in January for his annual physical.

## 2023-12-28 NOTE — TELEPHONE ENCOUNTER
Requested Prescriptions     Pending Prescriptions Disp Refills    AZATHIOPRINE 50 MG Oral Tab [Pharmacy Med Name: azaTHIOprine Oral Tablet 50 MG] 450 tablet 0     Sig: TAKE 5 TABLETS BY MOUTH DAILY     LOV: 07/12/2023  Last Refill: 07/13/2023

## 2024-02-16 ENCOUNTER — PATIENT MESSAGE (OUTPATIENT)
Facility: CLINIC | Age: 59
End: 2024-02-16

## 2024-02-16 NOTE — TELEPHONE ENCOUNTER
From: Pierce Smith  To: Jose Gerard  Sent: 2/16/2024 4:20 PM CST  Subject: blood test    hello  went in for blood test last week.   wondered if they sent in the results to you...  [facility] did the blood work.  thanks,pierce

## 2024-02-17 NOTE — TELEPHONE ENCOUNTER
As per KENJI parameters I left a message on the patient's mobile voicemail.  Liver chemistries are normal with the exception of a trivial elevation in ALT.  Hemoglobin is slightly increased (similar to previous values).  WBC is acceptable.  I have recommended that he continue the current dose of azathioprine.  I would recommend repeat lab work in 3 months.  I have asked Pierce to contact me with any questions or any symptoms.

## 2024-03-31 NOTE — LETTER
Group Topic: BH Medication Education    Date: 3/31/2024  Start Time: 1330  End Time: 1415  Facilitators: Pedro Roper RN; Ingrid Torres RN    Focus: Medication Education  Number in attendance: 22    Method: Group  Attendance: Present  Participation: Active  Patient Response: Appropriate feedback and Poor eye contact  Mood: Normal  Affect: Type: Euthymic (normal mood)   Range: Full (normal)   Congruency: Congruent   Stability: Stable  Behavior/Socialization: Appropriate to group and Cooperative  Thought Process: Focused  Task Performance: Follows directions  Patient Evaluation: Independent - full participation    Patient with brighter affect and improved mood. Reports her anxiety is better and that she was not experiencing any mood swings. Attending groups with participation, visible in the milieu and social with peers. Patient hopeful for discharge Wednesday after ECT# Cooperative with staff and compliant with scheduled medications. Prn ibuprofen given this shift. Denies suicidal/homicidal ideations. Denies auditory/visual hallucinations. Symptom check for COVID completed. No concerns for COVID at this time. Social distancing utilized.  Psychotropic medication compliance: yes    Psychotropic medication side effects: denies    Psychotropic PRNs given: no    Appetite and food intake: good    Sleep hours (if applicable): n/a       [facility] [address], [place], [place]    Authorization for Surgical Operation and Procedure                               I hereby authorize Jose Gerard MD, my physician and his/her assistants (if applicable), which may include medical students, residents, and/or fellows, to perform the following surgical operation/ procedure and administer such anesthesia as may be determined necessary by my physician: Operation/Procedure name (s) COLONOSCOPY on Pierce CATRACHO Smith   2.   I recognize that during the surgical operation/procedure, unforeseen conditions may necessitate additional or different procedures than those listed above.  I, therefore, further authorize and request that the above-named surgeon, assistants, or designees perform such procedures as are, in their judgment, necessary and desirable.    3.   My surgeon/physician has discussed prior to my surgery the potential benefits, risks and side effects of this procedure; the likelihood of achieving goals; and potential problems that might occur during recuperation.  They also discussed reasonable alternatives to the procedure, including risks, benefits, and side effects related to the alternatives and risks related to not receiving this procedure.  I have had all my questions answered and I acknowledge that no guarantee has been made as to the result that may be obtained.    4.   Should the need arise during my operation/procedure, which includes change of level of care prior to discharge, I also consent to the administration of blood and/or blood products.  Further, I understand that despite careful testing and screening of blood or blood products by collecting agencies, I may still be subject to ill effects as a result of receiving a blood transfusion and/or blood products.  The following are some, but not all, of the potential risks that can occur: fever and allergic reactions, hemolytic reactions, transmission of diseases such  as Hepatitis, AIDS and Cytomegalovirus (CMV) and fluid overload.  In the event that I wish to have an autologous transfusion of my own blood, or a directed donor transfusion, I will discuss this with my physician.  Check only if Refusing Blood or Blood Products  I understand refusal of blood or blood products as deemed necessary by my physician may have serious consequences to my condition to include possible death. I hereby assume responsibility for my refusal and release the hospital, its personnel, and my physicians from any responsibility for the consequences of my refusal.    o  Refuse   5.   I authorize the use of any specimen, organs, tissues, body parts or foreign objects that may be removed from my body during the operation/procedure for diagnosis, research or teaching purposes and their subsequent disposal by hospital authorities.  I also authorize the release of specimen test results and/or written reports to my treating physician on the hospital medical staff or other referring or consulting physicians involved in my care, at the discretion of the Pathologist or my treating physician.    6.   I consent to the photographing or videotaping of the operations or procedures to be performed, including appropriate portions of my body for medical, scientific, or educational purposes, provided my identity is not revealed by the pictures or by descriptive texts accompanying them.  If the procedure has been photographed/videotaped, the surgeon will obtain the original picture, image, videotape or CD.  The hospital will not be responsible for storage, release or maintenance of the picture, image, tape or CD.    7.   I consent to the presence of a  or observers in the operating room as deemed necessary by my physician or their designees.    8.   I recognize that in the event my procedure results in extended X-Ray/fluoroscopy time, I may develop a skin reaction.    9. If I have a Do Not Attempt  Resuscitation (DNAR) order in place, that status will be suspended while in the operating room, procedural suite, and during the recovery period unless otherwise explicitly stated by me (or a person authorized to consent on my behalf). The surgeon or my attending physician will determine when the applicable recovery period ends for purposes of reinstating the DNAR order.  10. Patients having a sterilization procedure: I understand that if the procedure is successful the results will be permanent and it will therefore be impossible for me to inseminate, conceive, or bear children.  I also understand that the procedure is intended to result in sterility, although the result has not been guaranteed.   11. I acknowledge that my physician has explained sedation/analgesia administration to me including the risk and benefits I consent to the administration of sedation/analgesia as may be necessary or desirable in the judgment of my physician.    I CERTIFY THAT I HAVE READ AND FULLY UNDERSTAND THE ABOVE CONSENT TO OPERATION and/or OTHER PROCEDURE.     ____________________________________  _________________________________        ______________________________  Signature of Patient    Signature of Responsible Person                Printed Name of Responsible Person                                      ____________________________________  _____________________________                ________________________________  Signature of Witness        Date  Time         Relationship to Patient    STATEMENT OF PHYSICIAN My signature below affirms that prior to the time of the procedure; I have explained to the patient and/or his/her legal representative, the risks and benefits involved in the proposed treatment and any reasonable alternative to the proposed treatment. I have also explained the risks and benefits involved in refusal of the proposed treatment and alternatives to the proposed treatment and have answered the patient's  questions. If I have a significant financial interest in a co-management agreement or a significant financial interest in any product or implant, or other significant relationship used in this procedure/surgery, I have disclosed this and had a discussion with my patient.     _____________________________________________________              _____________________________  (Signature of Physician)                                                                                         (Date)                                   (Time)  Patient Name: Pierce Riosfátima      : 10/9/1965      Printed: 5/15/2025     Medical Record #: [id number]                                      Page 1 of 1

## 2024-05-29 ENCOUNTER — TELEPHONE (OUTPATIENT)
Facility: CLINIC | Age: 59
End: 2024-05-29

## 2024-05-29 NOTE — TELEPHONE ENCOUNTER
----- Message from Bethany TOLBERT sent at 7/26/2019 11:43 AM CDT -----  Regarding: Recall colon in 5 years entered per Dr. Gerard & updated in health maintenance. Colon done 7/23/19  Recall colon in 5 years entered per Dr. Gerard & updated in health maintenance. Colon done 7/23/19

## 2024-06-07 ENCOUNTER — TELEPHONE (OUTPATIENT)
Facility: CLINIC | Age: 59
End: 2024-06-07

## 2024-06-07 DIAGNOSIS — Z87.19 HISTORY OF CROHN'S DISEASE: Primary | ICD-10-CM

## 2024-06-07 RX ORDER — SODIUM, POTASSIUM,MAG SULFATES 17.5-3.13G
SOLUTION, RECONSTITUTED, ORAL ORAL
Qty: 1 EACH | Refills: 0 | Status: SHIPPED | OUTPATIENT
Start: 2024-06-07

## 2024-06-07 RX ORDER — ATORVASTATIN CALCIUM 10 MG/1
10 TABLET, FILM COATED ORAL DAILY
COMMUNITY

## 2024-06-07 NOTE — TELEPHONE ENCOUNTER
I left a message on the patient's voicemail.  Chemistries are within normal limits.  CBC shows an acceptable white blood cell count.  Hemoglobin is again elevated.  I have asked Pierce to discuss this with his primary care provider specifically whether sleep apnea has been excluded which could cause an elevated hemoglobin.  He will otherwise continue the immunomodulator and repeat labs in 3 months.  We will contact him to schedule a colonoscopy for Crohn's surveillance.

## 2024-06-07 NOTE — TELEPHONE ENCOUNTER
Patient states he received his recall letter about his CLN. Patient would like to schedule his colonoscopy with DR. Weaver. Please advise.

## 2024-06-07 NOTE — TELEPHONE ENCOUNTER
Dr Garcia    Called patient for CLN recall  Medications, pharmacy, and allergies verified with the patient.   **Please advise on colonoscopy and bowel prep orders**.   › Insurance:  awaiting determination for medicaid application  › Last PCP Visit: 1/3/2023  › Last CBC / CMP drawn: 6/4/2024  › H/W/BMI: 6'0/ 250 lbs/ 33.9    Special comments/notes:  Recall details:     Last Procedure, Date, MD:    7/23/2029 CLN by Dr DODGE   Last diagnosis:  Hyperplastic polyp, small bowel mucosa w/ ulceration, granulation tissue formation and ass. Chronic and active ileitis   Recalled for (mth/yrs):  5 yrs ( July 2024)   Sedation used previously:  MAC   Last Prep Used:  suprep   Quality of Prep:  Fair, inspissated bile stained material irrigatred and suctioned, some were resistant     Telephone colon screening Questionnaires:  Yes No   Are you currently experiencing any new GI symptoms? [] [x]   If yes, symptom details:     Rectal Bleeding with or without bowel movements: [] [x]   Black stool: [] [x]   Dysphagia &Food feeling/getting stuck: [] [x]   Intractable Vomiting: [] [x]   Unexplained weight loss: [] [x]   First colonoscopy? [] [x]   Family history of colon cancer? [] [x]   Any issues with anesthesia? [] [x]   If yes, explain:      Personal history of Resp. Issues/Oxygen Use/JAUN/COPD? [] [x]   If yes, CPAP/BiPAP? [] []   History of devices Pacemaker/Defibrillator/Stents? [] [x]   History of Cardiac/CVA issues/(MI/Stroke):  [] [x]     Medication usage:  Yes  No   Anticoagulants:  Anticoagulant (Except Aspirin) ? Route to RN staff to obtain ordering provider orders [] [x]   Diabetic Meds:   PO DM Meds ? hold day prior and day of procedure  Insulin ? Route to RN clinical staff to obtain provider orders  [] [x]   Weight loss meds (Phentermine/Vyvanse/Saxsenda/etc): [] [x]   Iron/Herbal/Multivitamin Supplement (RX/OTC): [] [x]   Usage of marijuana, CBD &/or vape products: [] [x]      Jose Gerard MD    Physician  Gastroenterology     Operative Report  Signed     Date of Service: 7/23/2019  7:08 AM  Case Time: Procedures: Surgeons:   7/23/2019  7:27 AM COLONOSCOPY    Jose Gerard MD      Signed         [facility] Endoscopy Report        Date of Procedure:  07/23/19        Preoperative Diagnosis:  Crohn's disease evaluate disease extent and activity        Postoperative Diagnosis:  1.  Focal ileitis  2.  Widely patent ileoileal and colocolonic anastomoses  3.  Colon polyp  4.  Fair colonoscopic preparation        Procedure:    Colonoscopy with polypectomy and biopsy        Surgeon:  Jose Gerard M.D.        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  The patient underwent a laparoscopic ileal and sigmoid resection in July 2013.  He has been on 6-MP maintenance with a therapeutic 6-TG level.  The patient's last colonoscopy 5 years prior revealed focally active Crohn's ileitis involving the ileoileal anastomosis.  The colocolonic anastomosis and remainder of the colon was essentially normal.  Escalating therapy to a biologic was discussed, however, the patient wished to continue current therapy.  He has been asymptomatic and presents for a surveillance examination at this time.        Technique:  After informed consent, the patient was placed in the left lateral recumbent position.  Digital rectal examination revealed no palpable intraluminal abnormalities.  An Olympus variable stiffness 190 series HD pediatric colonoscope was inserted into the rectum and advanced under direct vision by following the lumen to the terminal ileum.  The colon was examined upon withdrawal in the left lateral recumbent position.       Findings:  The  preparation of the colon was only fair.  There was inspissated bile-stained material present throughout the colon that in some areas was resistant to irrigation and suctioning.  The ileocecal valve was patulous.  There was a focal superficial 6 mm ulceration a few centimeters proximal to the ileocecal valve.  The ileum was slightly edematous and ringlike at this point.  It was difficult to ascertain whether this area was at or immediately distal to the patient's ileoileal anastomosis.  The area was not significantly strictured allowing passage of the endoscope into a normal-appearing ileum. The visualized colonic mucosa from the cecum to the anal verge was normal with an intact vascular pattern.  There were no signs of Crohn's colitis.  Surveillance biopsies were not obtained as there have been no signs of diffuse visual or microscopic inflammation in the colon.  The colocolonic anastomosis was less conspicuous than before but widely patent.  In the distal sigmoid colon there was a 7 mm sessile polyp which was cold snare excised and retrieved.  No ongoing bleeding.  There were no other colonic polyps, definite diverticula, mass lesions, vascular anomalies or signs of inflammation seen.  Retroflexion in the rectum revealed no abnormalities.  The procedure was well tolerated without immediate complication.     Impression:  1.  Focal ileitis  2.  Widely patent ileoileal and colocolonic anastomoses  3.  Colon polyp  4.  Fair colonoscopic preparation  5.  The endoscopic appearance of the ileum is stable as compared to 5 years prior.     Recommendations:  1.  Continue 6-MP.  2.  Follow-up biopsy results.  3.  Recommendations regarding surveillance pending review of biopsies.                   7/25/2019  6:01 PM CDT       I spoke to Pierce.  He is feeling well.  The ileal biopsies revealed inflammation as expected.  The polyp was hyperplastic.  The patient's endoscopic findings are stable as compared to his previous  colonoscopy.  I am recommending that we continue the current dose of azathioprine.  Pierce knows that he should obtain laboratory testing every 3 months.  Follow-up office visits yearly.  Repeat colonoscopy in 5 years or sooner if issues arise.     GI RN staff: Please enter colonoscopy recall for 5 years.     .    2 Result Notes  Final Diagnosis:      A. Ileum; biopsy:  Small bowel mucosa with ulceration, granulation tissue formation, and associated chronic and active ileitis  No evidence of granuloma or dysplasia     B. Sigmoid colon polyp; biopsy:  Hyperplastic polyp     Comment: The patient's history of Crohn's disease is noted.

## 2024-06-07 NOTE — TELEPHONE ENCOUNTER
May schedule a colonoscopy for a history of Crohn's disease following a split dose Suprep and monitored anesthesia care.

## 2024-06-09 ENCOUNTER — PATIENT MESSAGE (OUTPATIENT)
Facility: CLINIC | Age: 59
End: 2024-06-09

## 2024-06-10 NOTE — TELEPHONE ENCOUNTER
Note from below: › Insurance:  awaiting determination for medicaid application     Patient needs insurance on file to be scheduled for his Colonoscopy. If patient calls to schedule please get his active insurance on his chart for him to scheduled.     Thank you!

## 2024-06-11 NOTE — TELEPHONE ENCOUNTER
Current Outpatient Medications   Medication Sig Dispense Refill    azaTHIOprine 50 MG Oral Tab TAKE 5 TABLETS BY MOUTH DAILY 450 tablet 1

## 2024-06-11 NOTE — TELEPHONE ENCOUNTER
Scheduled for:  Colonoscopy 60952  Provider Name:  Dr. Gerard  Date:  11/12/2024- patient is waiting on new insurance card- is aware to call with new info  Location:  [facility]  Sedation:  MAC  Time:  11:15am, (pt is aware to arrive at 10:15am)   Prep:  Suprep  Meds/Allergies Reconciled?:  Physician reviewed     Diagnosis with codes:  history of Crohn's disease Z87.19  Was patient informed to call insurance with codes (Y/N):  Yes, I confirmed MEDICAID  insurance with this patient.      Referral sent?:  Referral was sent at the time of electronic surgical scheduling.   [facility] or [facility] notified?:  I sent an electronic request to Endo Scheduling and received a confirmation today.      Medication Orders:  n/a  Misc Orders:  n/a     Further instructions given by staff:  I discussed the prep instructions with the patient which he verbally understood and is aware that I will mail the instructions today.

## 2024-06-12 RX ORDER — AZATHIOPRINE 50 MG/1
TABLET ORAL
Qty: 450 TABLET | Refills: 1 | Status: SHIPPED | OUTPATIENT
Start: 2024-06-12

## 2024-06-12 NOTE — TELEPHONE ENCOUNTER
Dr Garcia    The patient is requesting refill for azathioprine.    Last office visit: 7/12/2023  Colonoscopy scheduled on 11/12/2024    Last refill: 12/28/2023    D:450     R:1    Order pended, please sign if ok.    Thank you

## 2024-06-26 ENCOUNTER — PATIENT MESSAGE (OUTPATIENT)
Facility: CLINIC | Age: 59
End: 2024-06-26

## 2024-06-27 ENCOUNTER — TELEPHONE (OUTPATIENT)
Facility: CLINIC | Age: 59
End: 2024-06-27

## 2024-06-27 NOTE — TELEPHONE ENCOUNTER
Problem: PHYSICAL THERAPY ADULT  Goal: Performs mobility at highest level of function for planned discharge setting  See evaluation for individualized goals  Treatment/Interventions: Functional transfer training, LE strengthening/ROM, Elevations, Therapeutic exercise, Endurance training, Cognitive reorientation, Patient/family training, Equipment eval/education, Bed mobility, Gait training, Spoke to nursing, Spoke to case management, OT          See flowsheet documentation for full assessment, interventions and recommendations  Prognosis: Fair  Problem List: Decreased endurance, Impaired balance, Decreased mobility, Decreased coordination, Decreased cognition, Impaired judgement, Decreased safety awareness, Impaired vision  Assessment: pt recenly discharged from rehab for sdh, fall ad sustained recurrent sdh needin crani for evacuation of acute on chronic clot  pt refered to PT  pt was needin assist at home, using rw for amb per chart notes  pt has had numerous falls  pt demonstrated moderate to severe functional limitations due to deficis in cognition, balance, coordination, gait sequencing and stability, pt was able to mobilize in bed with min assist  stand and amb a few steps with min assist of 2 persons  pt had gait ataxia, ? apraxia  pt will need skilled PT, will need rehab  Barriers to Discharge: Inaccessible home environment     Recommendation: Post acute IP rehab     PT - OK to Discharge: No    See flowsheet documentation for full assessment  [employer]Âº message sent to patient.  This message changed to a telephone encounter.

## 2024-06-27 NOTE — TELEPHONE ENCOUNTER
From: Pierce Smith  To: Jose Gerard  Sent: 6/26/2024 11:56 PM CDT  Subject: colonoscopy [scheduling nurse]    hello nurse,  i made a mistake when i scheduled my proceedure. i meant to get an appointment at the [facility] on [address] ....wanted to save money and have it be an out patient instead of at hospital.     thanks,,,hope you can reschedule for me.    pierce galvan

## 2024-06-27 NOTE — TELEPHONE ENCOUNTER
From: Pierce Smith  To: Jose Gerard  Sent: 6/26/2024 11:56 PM CDT  Subject: colonoscopy [scheduling nurse]    hello nurse,  i made a mistake when i scheduled my proceedure. i meant to get an appointment at the [facility] on [address] ....wanted to save money and have it be an out patient instead of at hospital. thanks,,,hope you can reschedule for me. pierce galvan

## 2024-07-01 ENCOUNTER — TELEPHONE (OUTPATIENT)
Facility: CLINIC | Age: 59
End: 2024-07-01

## 2024-07-01 NOTE — TELEPHONE ENCOUNTER
RN4 days ago     MB  From: Pierce Smith  To: Jose Gerard  Sent: 6/26/2024 11:56 PM CDT  Subject: colonoscopy [scheduling nurse]    hello nurse,  i made a mistake when i scheduled my proceedure. i meant to get an appointment at the [facility] on [address] ....wanted to save money and have it be an out patient instead of at hospital.     thanks,,,hope you can reschedule for me.    pierce galvan

## 2024-07-01 NOTE — TELEPHONE ENCOUNTER
GI     Please reach out to the patient to reschedule colonoscopy.    Prefers another location    Thanks

## 2024-10-21 ENCOUNTER — PATIENT OUTREACH (OUTPATIENT)
Dept: CASE MANAGEMENT | Age: 59
End: 2024-10-21

## 2024-10-21 NOTE — PROCEDURES
The office order for PCP removal request is Approved and finalized on October 21, 2024.    Removed René June MD as the patient's Primary Care Physician

## 2024-12-06 ENCOUNTER — PATIENT MESSAGE (OUTPATIENT)
Facility: CLINIC | Age: 59
End: 2024-12-06

## 2024-12-11 ENCOUNTER — LAB ENCOUNTER (OUTPATIENT)
Dept: LAB | Age: 59
End: 2024-12-11
Attending: INTERNAL MEDICINE
Payer: MEDICAID

## 2024-12-11 DIAGNOSIS — K50.80 CROHN'S DISEASE OF BOTH SMALL AND LARGE INTESTINE WITHOUT COMPLICATION (HCC): ICD-10-CM

## 2024-12-11 LAB
ALBUMIN SERPL-MCNC: 5 G/DL (ref 3.2–4.8)
ALBUMIN/GLOB SERPL: 1.6 {RATIO} (ref 1–2)
ALP LIVER SERPL-CCNC: 58 U/L
ALT SERPL-CCNC: 22 U/L
ANION GAP SERPL CALC-SCNC: 7 MMOL/L (ref 0–18)
AST SERPL-CCNC: 22 U/L (ref ?–34)
BASOPHILS # BLD AUTO: 0.05 X10(3) UL (ref 0–0.2)
BASOPHILS NFR BLD AUTO: 0.4 %
BILIRUB SERPL-MCNC: 1 MG/DL (ref 0.2–1.1)
BUN BLD-MCNC: 12 MG/DL (ref 9–23)
BUN/CREAT SERPL: 9.5 (ref 10–20)
CALCIUM BLD-MCNC: 10.3 MG/DL (ref 8.7–10.4)
CHLORIDE SERPL-SCNC: 100 MMOL/L (ref 98–112)
CO2 SERPL-SCNC: 34 MMOL/L (ref 21–32)
CREAT BLD-MCNC: 1.26 MG/DL
DEPRECATED RDW RBC AUTO: 44.9 FL (ref 35.1–46.3)
EGFRCR SERPLBLD CKD-EPI 2021: 65 ML/MIN/1.73M2 (ref 60–?)
EOSINOPHIL # BLD AUTO: 0.04 X10(3) UL (ref 0–0.7)
EOSINOPHIL NFR BLD AUTO: 0.3 %
ERYTHROCYTE [DISTWIDTH] IN BLOOD BY AUTOMATED COUNT: 13.9 % (ref 11–15)
FASTING STATUS PATIENT QL REPORTED: YES
GLOBULIN PLAS-MCNC: 3.1 G/DL (ref 2–3.5)
GLUCOSE BLD-MCNC: 128 MG/DL (ref 70–99)
HCT VFR BLD AUTO: 48.2 %
HGB BLD-MCNC: 17.8 G/DL
IMM GRANULOCYTES # BLD AUTO: 0.07 X10(3) UL (ref 0–1)
IMM GRANULOCYTES NFR BLD: 0.5 %
LYMPHOCYTES # BLD AUTO: 1.11 X10(3) UL (ref 1–4)
LYMPHOCYTES NFR BLD AUTO: 8 %
MCH RBC QN AUTO: 32.4 PG (ref 26–34)
MCHC RBC AUTO-ENTMCNC: 36.9 G/DL (ref 31–37)
MCV RBC AUTO: 87.6 FL
MONOCYTES # BLD AUTO: 1.03 X10(3) UL (ref 0.1–1)
MONOCYTES NFR BLD AUTO: 7.4 %
NEUTROPHILS # BLD AUTO: 11.63 X10 (3) UL (ref 1.5–7.7)
NEUTROPHILS # BLD AUTO: 11.63 X10(3) UL (ref 1.5–7.7)
NEUTROPHILS NFR BLD AUTO: 83.4 %
OSMOLALITY SERPL CALC.SUM OF ELEC: 293 MOSM/KG (ref 275–295)
PLATELET # BLD AUTO: 186 10(3)UL (ref 150–450)
POTASSIUM SERPL-SCNC: 3.5 MMOL/L (ref 3.5–5.1)
PROT SERPL-MCNC: 8.1 G/DL (ref 5.7–8.2)
RBC # BLD AUTO: 5.5 X10(6)UL
SODIUM SERPL-SCNC: 141 MMOL/L (ref 136–145)
WBC # BLD AUTO: 13.9 X10(3) UL (ref 4–11)

## 2024-12-11 PROCEDURE — 80053 COMPREHEN METABOLIC PANEL: CPT

## 2024-12-11 PROCEDURE — 36415 COLL VENOUS BLD VENIPUNCTURE: CPT

## 2024-12-11 PROCEDURE — 85025 COMPLETE CBC W/AUTO DIFF WBC: CPT

## 2025-04-09 ENCOUNTER — TELEPHONE (OUTPATIENT)
Facility: CLINIC | Age: 60
End: 2025-04-09

## 2025-04-09 NOTE — TELEPHONE ENCOUNTER
Dr Weaver- patient left note with fax number to reach out to [facility] to obtain lab results, however his results are available in care everywhere(CBC/Cmp/lipid panel). Confirmed with patient he is referencing labs done on 3/18/25.

## 2025-05-20 ENCOUNTER — HOSPITAL ENCOUNTER (OUTPATIENT)
Facility: HOSPITAL | Age: 60
Setting detail: HOSPITAL OUTPATIENT SURGERY
Discharge: HOME | End: 2025-05-20
Attending: INTERNAL MEDICINE | Admitting: INTERNAL MEDICINE
Payer: MEDICAID

## 2025-05-20 ENCOUNTER — ANESTHESIA (OUTPATIENT)
Dept: ENDOSCOPY | Facility: HOSPITAL | Age: 60
End: 2025-05-20
Payer: MEDICAID

## 2025-05-20 ENCOUNTER — ANESTHESIA EVENT (OUTPATIENT)
Dept: ENDOSCOPY | Facility: HOSPITAL | Age: 60
End: 2025-05-20
Payer: MEDICAID

## 2025-05-20 VITALS
DIASTOLIC BLOOD PRESSURE: 75 MMHG | OXYGEN SATURATION: 95 % | HEART RATE: 79 BPM | RESPIRATION RATE: 18 BRPM | BODY MASS INDEX: 33.18 KG/M2 | SYSTOLIC BLOOD PRESSURE: 134 MMHG | HEIGHT: 72 IN | WEIGHT: 245 LBS

## 2025-05-20 DIAGNOSIS — K50.919 CROHN'S DISEASE WITH COMPLICATION, UNSPECIFIED GASTROINTESTINAL TRACT LOCATION (HCC): ICD-10-CM

## 2025-05-20 PROBLEM — Z12.11 SCREENING FOR COLORECTAL CANCER: Status: ACTIVE | Noted: 2025-05-20

## 2025-05-20 PROBLEM — Z12.12 SCREENING FOR COLORECTAL CANCER: Status: ACTIVE | Noted: 2025-05-20

## 2025-05-20 PROCEDURE — 45380 COLONOSCOPY AND BIOPSY: CPT | Performed by: INTERNAL MEDICINE

## 2025-05-20 RX ORDER — NALOXONE HYDROCHLORIDE 0.4 MG/ML
0.08 INJECTION, SOLUTION INTRAMUSCULAR; INTRAVENOUS; SUBCUTANEOUS ONCE AS NEEDED
Status: DISCONTINUED | OUTPATIENT
Start: 2025-05-20 | End: 2025-05-20

## 2025-05-20 RX ORDER — SODIUM CHLORIDE, SODIUM LACTATE, POTASSIUM CHLORIDE, CALCIUM CHLORIDE 600; 310; 30; 20 MG/100ML; MG/100ML; MG/100ML; MG/100ML
INJECTION, SOLUTION INTRAVENOUS CONTINUOUS
Status: DISCONTINUED | OUTPATIENT
Start: 2025-05-20 | End: 2025-05-20

## 2025-05-20 RX ORDER — LIDOCAINE HYDROCHLORIDE 10 MG/ML
INJECTION, SOLUTION EPIDURAL; INFILTRATION; INTRACAUDAL; PERINEURAL AS NEEDED
Status: DISCONTINUED | OUTPATIENT
Start: 2025-05-20 | End: 2025-05-20 | Stop reason: SURG

## 2025-05-20 RX ADMIN — SODIUM CHLORIDE, SODIUM LACTATE, POTASSIUM CHLORIDE, CALCIUM CHLORIDE: 600; 310; 30; 20 INJECTION, SOLUTION INTRAVENOUS at 08:16:00

## 2025-05-20 RX ADMIN — LIDOCAINE HYDROCHLORIDE 50 MG: 10 INJECTION, SOLUTION EPIDURAL; INFILTRATION; INTRACAUDAL; PERINEURAL at 08:23:00

## 2025-05-20 NOTE — ANESTHESIA POSTPROCEDURE EVALUATION
Patient: Pierce Smith    Procedure Summary       Date: 05/20/25 Room / Location: [facility] ENDOSCOPY 04 / [facility] ENDOSCOPY    Anesthesia Start: 0816 Anesthesia Stop: 0854    Procedure: COLONOSCOPY with biopsy Diagnosis:       Crohn's disease with complication, unspecified gastrointestinal tract location (HCC)      (Mild Ileitis)    Surgeons: Jose Gerard MD Anesthesiologist: Harjeet Dawson CRNA    Anesthesia Type: MAC ASA Status: 2            Anesthesia Type: No value filed.    Vitals Value Taken Time   /75 05/20/25 08:56   Temp  05/20/25 08:56   Pulse 72 05/20/25 08:56   Resp 16 05/20/25 08:56   SpO2 100 05/20/25 08:56       [facility] AN Post Evaluation:   Patient Evaluated in PACU  Patient Participation: complete - patient participated  Level of Consciousness: awake, awake and alert and responsive to verbal stimuli  Pain Score: 0  Pain Management: adequate  Airway Patency:patent  Yes    Nausea/Vomiting: none  Cardiovascular Status: acceptable, stable and hemodynamically stable  Respiratory Status: spontaneous ventilation, nonlabored ventilation and room air  Postoperative Hydration acceptable      Harjeet Dawson CRNA  5/20/2025 8:56 AM

## 2025-05-20 NOTE — H&P
History & Physical Examination    Patient Name: Pierce Smith  MRN: [id number]  CSN: [id number]  YOB: 1965    Diagnosis: Colorectal cancer screening, Crohn's disease      Prescriptions Prior to Admission[1]  Current Hospital Medications[2]    Allergies: Allergies[3]    Past Medical History[4]  Past Surgical History[5]  Family History[6]  Social History     Tobacco Use    Smoking status: Former     Current packs/day: 0.00     Average packs/day: 0.5 packs/day for 5.0 years (2.5 ttl pk-yrs)     Types: Cigarettes     Start date: 1995     Quit date: 2000     Years since quittin.7    Smokeless tobacco: Never   Substance Use Topics    Alcohol use: Yes     Alcohol/week: 1.0 standard drink of alcohol     Types: 1 Cans of beer per week     Comment: very mild       SYSTEM Check if Review is Normal Check if Physical Exam is Normal If not normal, please explain:   HEENT [X ] [ X]    NECK  [X ] [ X]    HEART [X ] [ X]    LUNGS [X ] [ X]    ABDOMEN [X ] [ X]    EXTREMITIES [X ] [ X]    OTHER        [ x ] I have discussed the risks and benefits and alternatives with the patient/family.  They understand and agree to proceed with plan of care.  [ x ] I have reviewed the History and Physical done within the last 30 days.  Any changes noted above.    Jose Gerard MD  2025  8:15 AM             [1]   Medications Prior to Admission   Medication Sig Dispense Refill Last Dose/Taking    azaTHIOprine 50 MG Oral Tab TAKE 5 TABLETS BY MOUTH DAILY 450 tablet 1 2025    atorvastatin 10 MG Oral Tab Take 1 tablet (10 mg total) by mouth in the morning.   2025    Na Sulfate-K Sulfate-Mg Sulf (SUPREP BOWEL PREP KIT) 17.5-3.13-1.6 GM/177ML Oral Solution Take as directed 1 each 0 Taking    AMLODIPINE 5 MG Oral Tab TAKE 1 TABLET BY MOUTH DAILY 90 tablet 0 2025    LISINOPRIL-HYDROCHLOROTHIAZIDE 20-25 MG Oral Tab TAKE 1 TABLET BY MOUTH EVERY DAY 90 tablet 0 2025   [2]   Current  Facility-Administered Medications   Medication Dose Route Frequency    lactated ringers infusion   Intravenous Continuous   [3]   Allergies  Allergen Reactions    Penicillin G RASH   [4]   Past Medical History:   Crohn's disease (HCC)    Essential hypertension    Hyperlipidemia    Hyperplasia of prostate without lower urinary tract symptoms (LUTS)    Hypertriglyceridemia    Primary hypertension   [5]   Past Surgical History:  Procedure Laterality Date    Appendectomy  01/01/2013    Bowel resection  2013    Small bowel resection    Colectomy  01/01/2013    with takedown of splenic flexure    Colonoscopy      Colonoscopy N/A 07/23/2019    Procedure: COLONOSCOPY;  Surgeon: Jose Gerard MD;  Location: [facility] ENDOSCOPY    Other surgical history      Cystoscopy   [6]   Family History  Problem Relation Age of Onset    Hypertension Mother     Diabetes Mother     Endocrine Disorder Mother         Zollinger-carrasco syndrome    Hypertension Father     Heart Attack Father         MI    No Known Problems Sister     No Known Problems Sister     Crohn's Disease Sister     No Known Problems Brother     No Known Problems Brother     Other (MVA) Brother     No Known Problems Maternal Grandmother     No Known Problems Maternal Grandfather     Obesity Paternal Grandmother     Hypertension Paternal Grandmother     No Known Problems Paternal Grandfather     Colon Cancer Neg     Prostate Cancer Neg

## 2025-05-20 NOTE — OPERATIVE REPORT
[facility] Endoscopy Report      Date of Procedure:  05/20/25      Preoperative Diagnosis:  1.  Colorectal cancer screening  2.  Crohn's disease evaluate disease extent and activity      Postoperative Diagnosis:  1.  Mild  2.  Normal ileoileal and colocolonic anastomoses      Procedure:    Colonoscopy with biopsy      Surgeon:  Jose Gerard M.D.      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  The patient underwent laparoscopic ileal and sigmoid resections in July 2013.  He has been on maintenance mercaptopurine.  His last colonoscopy was 6 years prior with focal ileitis (6 mm nonfissuring ulceration) and patent anastomoses.  The patient has been asymptomatic on mercaptopurine and presents for a screening/surveillance colonoscopy at this time.  He is completely asymptomatic.      Technique:  After informed consent, the patient was placed in the left lateral recumbent position.  Digital rectal examination revealed no palpable intraluminal abnormalities.  An Olympus variable stiffness 190 series HD pediatric colonoscope was inserted into the rectum and advanced under direct vision by following the lumen to the terminal ileum.  The colon was examined upon withdrawal in the left lateral recumbent position.      Findings:  The preparation of the colon was good.  The terminal ileum was examined for at least 20 cm.  There was evidence of a widely patent side-to-side ileoileal anastomosis a few centimeters proximal to the ileocecal valve.  There was slight edema distal to the anastomosis and #3 punctate areas of erosion/granulation tissue without deep/fissuring ulcers or strictures.  Biopsies were obtained.  The ileum above the  anastomosis was completely normal.  The ileocecal valve was slightly scarred but patulous.  The visualized colonic mucosa from the cecum to the anal verge was normal with an intact vascular pattern.  The left-sided colocolonic anastomosis was inconspicuous, however, no strictures or ulcerations were seen.  There were no colonic polyps, definite diverticula, mass lesions, vascular anomalies or other signs of inflammation seen.  Retroflexion in the rectum revealed no abnormalities.  The procedure was well tolerated without immediate complication.      Impression:  1.  Mild ileitis stable in appearance and without worrisome features  2.  Normal ileoileal and colocolonic anastomoses    Recommendations:  1.  Follow-up biopsy results.  2.  Will discuss the risks versus benefits of continued thiopurine therapy versus alternative medications in light of the patient's age.        Jose Gerard MD  5/20/2025  8:15 AM

## 2025-05-20 NOTE — DISCHARGE INSTRUCTIONS
Home Care Instructions for Colonoscopy  Diet:  - Resume your regular diet as tolerated unless otherwise instructed.  - Start with light meals to minimize bloating.  - Do not drink alcohol today.    Medication:  - If you have questions about resuming your normal medications, please contact your Primary Care Physician.    Activities:  - Take it easy today. Do not return to work today.  - Do not drive today.  - Do not operate any machinery today (including kitchen equipment).    Colonoscopy:  - You may notice some rectal \"spotting\" (a little blood on the toilet tissue) for a day or two after the exam. This is normal.  - If you experience any rectal bleeding (not spotting), persistent tenderness or sharp severe abdominal pains, oral temperature over 100 degrees Fahrenheit, light-headedness or dizziness, or any other problems, contact your doctor.      **If unable to reach your doctor, please go to the [facility] Emergency Room**    - Your referring physician will receive a full report of your examination.  - If you do not hear from your doctor's office within two weeks of your biopsy, please call them for your results.    You may be able to see your laboratory results in [employer] between 4 and 7 business days.  In some cases, your physician may not have viewed the results before they are released to [employer].  If you have questions regarding your results contact the physician who ordered the test/exam by phone or via [employer] by choosing \"Ask a Medical Question.\"

## 2025-05-20 NOTE — ANESTHESIA PREPROCEDURE EVALUATION
Anesthesia PreOp Note    HPI:     Pierce Smith is a 59 year old male who presents for preoperative consultation requested by: Jose Gerard MD    Date of Surgery: 5/20/2025    Procedure(s):  COLONOSCOPY  Indication: Crohn's disease with complication, unspecified gastrointestinal tract location (HCC)    Relevant Problems   No relevant active problems       NPO:  Last Liquid Consumption Date: 05/20/25  Last Liquid Consumption Time: 0001  Last Solid Consumption Date: 05/18/25  Last Solid Consumption Time: 2000  Last Liquid Consumption Date: 05/20/25          History Review:  Patient Active Problem List    Diagnosis Date Noted    Snoring 01/03/2023    Cherry angioma 12/13/2022    Health maintenance examination 12/13/2022    Seborrheic keratosis 03/24/2021    Elevated hemoglobin 03/23/2021    Obesity 08/29/2016    Hyperplasia of prostate without lower urinary tract symptoms (LUTS) 11/16/2013    Hypertriglyceridemia 08/05/2013    Primary hypertension 12/14/2007    Crohn's disease (HCC) 03/2000       Past Medical History[1]    Past Surgical History[2]    Prescriptions Prior to Admission[3]  Current Medications and Prescriptions Ordered in Epic[4]    Allergies[5]    Family History[6]  Social Hx on file[7]    Available pre-op labs reviewed.             Vital Signs:  Body mass index is 33.23 kg/m².   height is 1.829 m (6') and weight is 111.1 kg (245 lb). His blood pressure is 129/73 and his pulse is 61. His respiration is 15 and oxygen saturation is 95%.   Vitals:    05/13/25 1207 05/20/25 0715 05/20/25 0720   BP:   129/73   Pulse:   61   Resp:   15   SpO2:   95%   Weight: 111.1 kg (245 lb) 111.1 kg (245 lb)    Height: 1.829 m (6') 1.829 m (6')         Anesthesia Evaluation     Patient summary reviewed and Nursing notes reviewed    No history of anesthetic complications   Airway   Mallampati: III  TM distance: >3 FB  Neck ROM: full  Dental - Dentition appears grossly intact     Pulmonary - normal exam     ROS  comment: Former smoker   Cardiovascular - normal exam  (+) hypertension    Rhythm: regular  Rate: normal    Neuro/Psych - negative ROS     GI/Hepatic/Renal    (+) bowel prep    Comments: Crohn's    Endo/Other      Comments: diagnosed with Crohn's in March 2000  Abdominal                  Anesthesia Plan:   ASA:  2  Plan:   MAC  Informed Consent Plan and Risks Discussed With:  Patient  Discussed plan with:  Surgeon      I have informed Pierce Smith and/or legal guardian or family member of the nature of the anesthetic plan, benefits, risks including possible dental damage if relevant, major complications, and any alternative forms of anesthetic management.   All of the patient's questions were answered to the best of my ability. The patient desires the anesthetic management as planned.  Harjeet Dawson, CRNA  5/20/2025 7:55 AM  Present on Admission:  **None**           [1]   Past Medical History:   Crohn's disease (HCC)    Essential hypertension    Hyperlipidemia    Hyperplasia of prostate without lower urinary tract symptoms (LUTS)    Hypertriglyceridemia    Primary hypertension   [2]   Past Surgical History:  Procedure Laterality Date    Appendectomy  01/01/2013    Bowel resection  2013    Small bowel resection    Colectomy  01/01/2013    with takedown of splenic flexure    Colonoscopy      Colonoscopy N/A 07/23/2019    Procedure: COLONOSCOPY;  Surgeon: Jose Gerard MD;  Location: [facility] ENDOSCOPY    Other surgical history      Cystoscopy   [3]   Medications Prior to Admission   Medication Sig Dispense Refill Last Dose/Taking    azaTHIOprine 50 MG Oral Tab TAKE 5 TABLETS BY MOUTH DAILY 450 tablet 1 5/19/2025    atorvastatin 10 MG Oral Tab Take 1 tablet (10 mg total) by mouth in the morning.   5/19/2025    Na Sulfate-K Sulfate-Mg Sulf (SUPREP BOWEL PREP KIT) 17.5-3.13-1.6 GM/177ML Oral Solution Take as directed 1 each 0 Taking    AMLODIPINE 5 MG Oral Tab TAKE 1 TABLET BY MOUTH DAILY 90 tablet 0 5/19/2025     LISINOPRIL-HYDROCHLOROTHIAZIDE 20-25 MG Oral Tab TAKE 1 TABLET BY MOUTH EVERY DAY 90 tablet 0 2025   [4]   Current Facility-Administered Medications Ordered in Epic   Medication Dose Route Frequency Provider Last Rate Last Admin    lactated ringers infusion   Intravenous Continuous Jose Gerard MD         No current [facility]-ordered outpatient medications on file.   [5]   Allergies  Allergen Reactions    Penicillin G RASH   [6]   Family History  Problem Relation Age of Onset    Hypertension Mother     Diabetes Mother     Endocrine Disorder Mother         Zollinger-carrasco syndrome    Hypertension Father     Heart Attack Father         MI    No Known Problems Sister     No Known Problems Sister     Crohn's Disease Sister     No Known Problems Brother     No Known Problems Brother     Other (MVA) Brother     No Known Problems Maternal Grandmother     No Known Problems Maternal Grandfather     Obesity Paternal Grandmother     Hypertension Paternal Grandmother     No Known Problems Paternal Grandfather     Colon Cancer Neg     Prostate Cancer Neg    [7]   Social History  Socioeconomic History    Marital status: Single   Tobacco Use    Smoking status: Former     Current packs/day: 0.00     Average packs/day: 0.5 packs/day for 5.0 years (2.5 ttl pk-yrs)     Types: Cigarettes     Start date: 1995     Quit date: 2000     Years since quittin.7    Smokeless tobacco: Never   Vaping Use    Vaping status: Never Used   Substance and Sexual Activity    Alcohol use: Yes     Alcohol/week: 1.0 standard drink of alcohol     Types: 1 Cans of beer per week     Comment: very mild    Drug use: No    Sexual activity: Not Currently     Partners: Female   Other Topics Concern    Caffeine Concern No    Stress Concern No    Weight Concern Yes    Special Diet No    Exercise Yes    Seat Belt Yes    Pt has a pacemaker No    Pt has a defibrillator No    Reaction to local anesthetic No

## 2025-06-03 NOTE — TELEPHONE ENCOUNTER
Therapy will be transitioning from azathioprine to alternative therapy based on age.  Please see colonoscopy surgical pathology result note.

## 2025-06-06 DIAGNOSIS — K50.919 CROHN'S DISEASE WITH COMPLICATION, UNSPECIFIED GASTROINTESTINAL TRACT LOCATION (HCC): Primary | ICD-10-CM

## 2025-06-06 NOTE — TELEPHONE ENCOUNTER
Lets try and get approval for Skyrizi.  Dosing as follows:    IV induction 600 mg at 0, 4 and 8 weeks  SC maintenance: 360 mg at week 12 and every 8 weeks thereafter.    The patient will also require hepatitis serologies and QuantiFERON gold.  I have entered the orders.

## 2025-06-06 NOTE — TELEPHONE ENCOUNTER
Dr. Gerard    I looked up the covered medication list for his plan for 2025  It just says that both Skyrizi and Stelara are \"PA\" which would need a prior authorization for approval.  There is no way to know 100% until therapy plan is placed for loading doses and request is able to be submitted to plan.    Please let me know what one of the two is preferred and we can try to get that one approved first.    Thank you

## 2025-06-06 NOTE — TELEPHONE ENCOUNTER
----- Message from Jose Gerard sent at 6/3/2025  5:35 PM CDT -----  I spoke to Pierce.  He is feeling well.  His ileal biopsies reveal inflammation as expected.  The inflammatory changes are relatively stable.  We discussed the ongoing azathioprine use especially over   the age of 59 and the increased risk of malignancy.  I would favor transitioning therapy to an alternative agent.  Pierce is in agreement.  We discussed exclusive IV medications (vedolizumab) or   hybrid medications with subcutaneous maintenance such as Skyrizi or Stelara.  Pierce would prefer the latter medications.  We will investigate.  Timing of follow-up colonoscopy to be discussed if   therapy is changed.  Pierce has also noted some \"spots\" on his back.  I am recommending that he make a follow-up appointment with Dr. Solis.    GI RNs: Can we investigate (based on the patient's insurance) whether insurance would cover Skyrizi, Stelara or Entyvio.  I will order the appropriate laboratory testing pending this decision.  The   patient is also having difficulty signing up for and accessing [employer].  Can we assist the patient in this regard?  Thank you.  ----- Message -----  From: Lab, Background User  Sent: 5/20/2025   4:02 PM CDT  To: Jose Gerard MD

## 2025-06-09 RX ORDER — RISANKIZUMAB-RZAA 360 MG/2.4
1 WEARABLE INJECTOR SUBCUTANEOUS
Qty: 1 EACH | Refills: 5 | Status: SHIPPED | OUTPATIENT
Start: 2025-06-09

## 2025-06-09 NOTE — TELEPHONE ENCOUNTER
I spoke to the patient  He is aware to get the blood work done.  Plans on doing it tomorrow.  He can't get into [employer] because he has 2 step authentication set up and he isn't getting the verification email. I reset it and advised he set it up to use his phone number instead and if that does not work given phone number to [employer] help desk.

## 2025-06-09 NOTE — TELEPHONE ENCOUNTER
Dr. Gerard    Therapy plan pended   I also pended on body injections to [facility]  Patient is going to go to [place] to get his labs drawn tomorrow morning.    Thank you

## 2025-06-10 ENCOUNTER — LAB ENCOUNTER (OUTPATIENT)
Dept: LAB | Age: 60
End: 2025-06-10
Attending: INTERNAL MEDICINE
Payer: MEDICAID

## 2025-06-10 LAB
ALBUMIN SERPL-MCNC: 4.9 G/DL (ref 3.2–4.8)
ALBUMIN/GLOB SERPL: 1.8 {RATIO} (ref 1–2)
ALP LIVER SERPL-CCNC: 55 U/L (ref 45–117)
ALT SERPL-CCNC: 25 U/L (ref 10–49)
ANION GAP SERPL CALC-SCNC: 8 MMOL/L (ref 0–18)
AST SERPL-CCNC: 31 U/L (ref ?–34)
BASOPHILS # BLD AUTO: 0.04 X10(3) UL (ref 0–0.2)
BASOPHILS NFR BLD AUTO: 0.6 %
BILIRUB SERPL-MCNC: 1.2 MG/DL (ref 0.2–1.1)
BUN BLD-MCNC: 12 MG/DL (ref 9–23)
BUN/CREAT SERPL: 9.2 (ref 10–20)
CALCIUM BLD-MCNC: 9.8 MG/DL (ref 8.7–10.4)
CHLORIDE SERPL-SCNC: 101 MMOL/L (ref 98–112)
CO2 SERPL-SCNC: 32 MMOL/L (ref 21–32)
CREAT BLD-MCNC: 1.31 MG/DL (ref 0.7–1.3)
DEPRECATED RDW RBC AUTO: 47.6 FL (ref 35.1–46.3)
EGFRCR SERPLBLD CKD-EPI 2021: 62 ML/MIN/1.73M2 (ref 60–?)
EOSINOPHIL # BLD AUTO: 0.1 X10(3) UL (ref 0–0.7)
EOSINOPHIL NFR BLD AUTO: 1.5 %
ERYTHROCYTE [DISTWIDTH] IN BLOOD BY AUTOMATED COUNT: 14.5 % (ref 11–15)
FASTING STATUS PATIENT QL REPORTED: YES
GLOBULIN PLAS-MCNC: 2.8 G/DL (ref 2–3.5)
GLUCOSE BLD-MCNC: 101 MG/DL (ref 70–99)
HAV AB SER QL IA: REACTIVE
HAV IGM SER QL: NONREACTIVE
HBV CORE AB SERPL QL IA: NONREACTIVE
HBV SURFACE AB SER QL: NONREACTIVE
HBV SURFACE AB SERPL IA-ACNC: 5.52 MIU/ML
HBV SURFACE AG SERPL QL IA: NONREACTIVE
HCT VFR BLD AUTO: 47.6 % (ref 39–53)
HCV AB SERPL QL IA: NONREACTIVE
HGB BLD-MCNC: 16.6 G/DL (ref 13–17.5)
IMM GRANULOCYTES # BLD AUTO: 0.02 X10(3) UL (ref 0–1)
IMM GRANULOCYTES NFR BLD: 0.3 %
LYMPHOCYTES # BLD AUTO: 1.28 X10(3) UL (ref 1–4)
LYMPHOCYTES NFR BLD AUTO: 18.6 %
MCH RBC QN AUTO: 31.1 PG (ref 26–34)
MCHC RBC AUTO-ENTMCNC: 34.9 G/DL (ref 31–37)
MCV RBC AUTO: 89.3 FL (ref 80–100)
MONOCYTES # BLD AUTO: 0.5 X10(3) UL (ref 0.1–1)
MONOCYTES NFR BLD AUTO: 7.3 %
NEUTROPHILS # BLD AUTO: 4.95 X10 (3) UL (ref 1.5–7.7)
NEUTROPHILS # BLD AUTO: 4.95 X10(3) UL (ref 1.5–7.7)
NEUTROPHILS NFR BLD AUTO: 71.7 %
OSMOLALITY SERPL CALC.SUM OF ELEC: 292 MOSM/KG (ref 275–295)
PLATELET # BLD AUTO: 185 10(3)UL (ref 150–450)
POTASSIUM SERPL-SCNC: 3.3 MMOL/L (ref 3.5–5.1)
PROT SERPL-MCNC: 7.7 G/DL (ref 5.7–8.2)
RBC # BLD AUTO: 5.33 X10(6)UL (ref 4.3–5.7)
SODIUM SERPL-SCNC: 141 MMOL/L (ref 136–145)
WBC # BLD AUTO: 6.9 X10(3) UL (ref 4–11)

## 2025-06-10 PROCEDURE — 80053 COMPREHEN METABOLIC PANEL: CPT | Performed by: INTERNAL MEDICINE

## 2025-06-10 PROCEDURE — 86706 HEP B SURFACE ANTIBODY: CPT | Performed by: INTERNAL MEDICINE

## 2025-06-10 PROCEDURE — 86704 HEP B CORE ANTIBODY TOTAL: CPT | Performed by: INTERNAL MEDICINE

## 2025-06-10 PROCEDURE — 87340 HEPATITIS B SURFACE AG IA: CPT | Performed by: INTERNAL MEDICINE

## 2025-06-10 PROCEDURE — 86803 HEPATITIS C AB TEST: CPT | Performed by: INTERNAL MEDICINE

## 2025-06-10 PROCEDURE — 86708 HEPATITIS A ANTIBODY: CPT | Performed by: INTERNAL MEDICINE

## 2025-06-10 PROCEDURE — 86480 TB TEST CELL IMMUN MEASURE: CPT | Performed by: INTERNAL MEDICINE

## 2025-06-10 PROCEDURE — 36415 COLL VENOUS BLD VENIPUNCTURE: CPT | Performed by: INTERNAL MEDICINE

## 2025-06-10 PROCEDURE — 86709 HEPATITIS A IGM ANTIBODY: CPT | Performed by: INTERNAL MEDICINE

## 2025-06-10 PROCEDURE — 85025 COMPLETE CBC W/AUTO DIFF WBC: CPT | Performed by: INTERNAL MEDICINE

## 2025-06-10 PROCEDURE — 80503 PATH CLIN CONSLTJ SF 5-20: CPT | Performed by: INTERNAL MEDICINE

## 2025-06-11 ENCOUNTER — TELEPHONE (OUTPATIENT)
Facility: CLINIC | Age: 60
End: 2025-06-11

## 2025-06-12 ENCOUNTER — OFFICE VISIT (OUTPATIENT)
Dept: DERMATOLOGY CLINIC | Facility: CLINIC | Age: 60
End: 2025-06-12

## 2025-06-12 DIAGNOSIS — L81.4 LENTIGO: ICD-10-CM

## 2025-06-12 DIAGNOSIS — Z79.624 ENCOUNTER FOR LONG TERM CURRENT USE OF AZATHIOPRINE: ICD-10-CM

## 2025-06-12 DIAGNOSIS — D23.70 BENIGN NEOPLASM OF SKIN OF LOWER LIMB, INCLUDING HIP, UNSPECIFIED LATERALITY: ICD-10-CM

## 2025-06-12 DIAGNOSIS — D48.5 NEOPLASM OF UNCERTAIN BEHAVIOR OF SKIN: Primary | ICD-10-CM

## 2025-06-12 DIAGNOSIS — D22.9 MULTIPLE NEVI: ICD-10-CM

## 2025-06-12 DIAGNOSIS — L82.1 VERRUCOUS KERATOSIS: ICD-10-CM

## 2025-06-12 DIAGNOSIS — L82.1 SK (SEBORRHEIC KERATOSIS): ICD-10-CM

## 2025-06-12 LAB
M TB IFN-G CD4+ T-CELLS BLD-ACNC: 0.03 IU/ML
M TB TUBERC IFN-G BLD QL: NEGATIVE
M TB TUBERC IGNF/MITOGEN IGNF CONTROL: 4.39 IU/ML
QFT TB1 AG MINUS NIL: 0 IU/ML
QFT TB2 AG MINUS NIL: 0 IU/ML

## 2025-06-12 PROCEDURE — 88305 TISSUE EXAM BY PATHOLOGIST: CPT | Performed by: DERMATOLOGY

## 2025-06-12 PROCEDURE — 88342 IMHCHEM/IMCYTCHM 1ST ANTB: CPT | Performed by: DERMATOLOGY

## 2025-06-12 NOTE — PROGRESS NOTES
The following individual(s) verbally consented to be recorded using ambient AI listening technology and understand that they can each withdraw their consent to this listening technology at any point by asking the clinician to turn off or pause the recording:    Patient name: Pierce Smith  Additional names:

## 2025-06-12 NOTE — TELEPHONE ENCOUNTER
See other TE 6/11/2025    Filed: 6/11/2025  2:17 PM Encounter Date: 6/11/2025 Status: Signed   : Daniela Schaefer 360MG/2.4ML CARTRIDGES     KEY: [id number]

## 2025-06-13 NOTE — TELEPHONE ENCOUNTER
PPD    Please assist with Shena ARMSTRONG prior authorization.    DX: Crohn's disease    Thank you

## 2025-06-16 NOTE — PROGRESS NOTES
Pierce Smith is a 59 year old male.  HPI:     CC:    Chief Complaint   Patient presents with    Full Skin Exam     LOV 6/30/23-Pt presents for FBSE.  Has concerns of tiny lesions on the middle of back.  Denies dryness or itching.  Denies personal or family Hx of skin cancer.         Allergies:  Penicillin g    HISTORY:    Past Medical History:    Crohn's disease (HCC)    Essential hypertension    Hyperlipidemia    Hyperplasia of prostate without lower urinary tract symptoms (LUTS)    Hypertriglyceridemia    Primary hypertension      Past Surgical History:   Procedure Laterality Date    Appendectomy  01/01/2013    Bowel resection  2013    Small bowel resection    Colectomy  01/01/2013    with takedown of splenic flexure    Colonoscopy      Colonoscopy N/A 07/23/2019    Procedure: COLONOSCOPY;  Surgeon: Jose Gerard MD;  Location: [facility] ENDOSCOPY    Colonoscopy N/A 5/20/2025    Procedure: COLONOSCOPY with biopsy;  Surgeon: Jose Gerard MD;  Location: [facility] ENDOSCOPY    Other surgical history      Cystoscopy      Family History   Problem Relation Age of Onset    Hypertension Mother     Diabetes Mother     Endocrine Disorder Mother         Zollinger-carrasco syndrome    Hypertension Father     Heart Attack Father         MI    No Known Problems Sister     No Known Problems Sister     Crohn's Disease Sister     No Known Problems Brother     No Known Problems Brother     Other (MVA) Brother     No Known Problems Maternal Grandmother     No Known Problems Maternal Grandfather     Obesity Paternal Grandmother     Hypertension Paternal Grandmother     No Known Problems Paternal Grandfather     Colon Cancer Neg     Prostate Cancer Neg       Social History     Socioeconomic History    Marital status: Single   Tobacco Use    Smoking status: Former     Current packs/day: 0.00     Average packs/day: 0.5 packs/day for 5.0 years (2.5 ttl pk-yrs)     Types: Cigarettes     Start date: 8/21/1995     Quit date: 8/21/2000      Years since quittin.8    Smokeless tobacco: Never   Vaping Use    Vaping status: Never Used   Substance and Sexual Activity    Alcohol use: Yes     Alcohol/week: 1.0 standard drink of alcohol     Types: 1 Cans of beer per week     Comment: very mild    Drug use: No    Sexual activity: Not Currently     Partners: Female   Other Topics Concern    Caffeine Concern No    Stress Concern No    Weight Concern Yes    Special Diet No    Exercise Yes    Seat Belt Yes    Pt has a pacemaker No    Pt has a defibrillator No    Reaction to local anesthetic No   Social History Narrative    Relationships: Single. Lives with brother Ted.     Children: None    Pets: 2 cats.     School: N/A    Work: None. Used to work for [employer] [address] [employer].     Origin: Grew up in [place].     Interests: Enjoys watching sports - Cubs and Bears, riding bike, going for walks.     Spiritual: Not Orthodox, not spiritual     Social Drivers of Health     Food Insecurity: Not on File (2024)    Received from [facility]    Food Insecurity     Food: 0   Transportation Needs: Not on File (3/18/2024)    Received from [facility]    Transportation Needs     Transportation: 0   Stress: Not on File (3/18/2024)    Received from [facility]    Stress     Stress: 0   Housing Stability: Not on File (3/18/2024)    Received from [facility]    Housing Stability     Housin        Current Outpatient Medications   Medication Sig Dispense Refill    Risankizumab-rzaa (SKYRIZI) 360 MG/2.4ML Subcutaneous Solution Cartridge Inject 1 Cartridge into the skin Every 8 (eight) weeks. Inject 360 mg on week 12 and then every 8 weeks thereafter 1 each 5    azaTHIOprine 50 MG Oral Tab TAKE 5 TABLETS BY MOUTH DAILY 450 tablet 1    atorvastatin 10 MG Oral Tab Take 1 tablet (10 mg total) by mouth in the morning.      AMLODIPINE 5 MG Oral Tab TAKE 1 TABLET BY MOUTH DAILY 90 tablet 0    LISINOPRIL-HYDROCHLOROTHIAZIDE 20-25 MG Oral Tab TAKE 1 TABLET BY MOUTH EVERY DAY 90  tablet 0     Allergies:   Allergies   Allergen Reactions    Penicillin G RASH       Past Medical History:    Crohn's disease (HCC)    Essential hypertension    Hyperlipidemia    Hyperplasia of prostate without lower urinary tract symptoms (LUTS)    Hypertriglyceridemia    Primary hypertension     Past Surgical History:   Procedure Laterality Date    Appendectomy  2013    Bowel resection      Small bowel resection    Colectomy  2013    with takedown of splenic flexure    Colonoscopy      Colonoscopy N/A 2019    Procedure: COLONOSCOPY;  Surgeon: Jose Gerard MD;  Location: [facility] ENDOSCOPY    Colonoscopy N/A 2025    Procedure: COLONOSCOPY with biopsy;  Surgeon: Jose Gerard MD;  Location: [facility] ENDOSCOPY    Other surgical history      Cystoscopy     Social History     Socioeconomic History    Marital status: Single     Spouse name: Not on file    Number of children: Not on file    Years of education: Not on file    Highest education level: Not on file   Occupational History    Not on file   Tobacco Use    Smoking status: Former     Current packs/day: 0.00     Average packs/day: 0.5 packs/day for 5.0 years (2.5 ttl pk-yrs)     Types: Cigarettes     Start date: 1995     Quit date: 2000     Years since quittin.8    Smokeless tobacco: Never   Vaping Use    Vaping status: Never Used   Substance and Sexual Activity    Alcohol use: Yes     Alcohol/week: 1.0 standard drink of alcohol     Types: 1 Cans of beer per week     Comment: very mild    Drug use: No    Sexual activity: Not Currently     Partners: Female   Other Topics Concern     Service Not Asked    Blood Transfusions Not Asked    Caffeine Concern No    Occupational Exposure Not Asked    Hobby Hazards Not Asked    Sleep Concern Not Asked    Stress Concern No    Weight Concern Yes    Special Diet No    Back Care Not Asked    Exercise Yes    Bike Helmet Not Asked    Seat Belt Yes    Self-Exams Not Asked     Grew up on a farm Not Asked    History of tanning Not Asked    Outdoor occupation Not Asked    Pt has a pacemaker No    Pt has a defibrillator No    Reaction to local anesthetic No   Social History Narrative    Relationships: Single. Lives with brother Ted.     Children: None    Pets: 2 cats.     School: N/A    Work: None. 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     Origin: Grew up in [place].     Interests: Enjoys watching sports - Cubs and Bears, riding bike, going for walks.     Spiritual: Not Samaritan, not spiritual     Social Drivers of Health     Food Insecurity: Not on File (2024)    Received from [facility]    Food Insecurity     Food: 0   Transportation Needs: Not on File (3/18/2024)    Received from [facility]    Transportation Needs     Transportation: 0   Stress: Not on File (3/18/2024)    Received from [facility]    Stress     Stress: 0   Housing Stability: Not on File (3/18/2024)    Received from [facility]    Housing Stability     Housin       Family History   Problem Relation Age of Onset    Hypertension Mother     Diabetes Mother     Endocrine Disorder Mother         Zollinger-carrasco syndrome    Hypertension Father     Heart Attack Father         MI    No Known Problems Sister     No Known Problems Sister     Crohn's Disease Sister     No Known Problems Brother     No Known Problems Brother     Other (MVA) Brother     No Known Problems Maternal Grandmother     No Known Problems Maternal Grandfather     Obesity Paternal Grandmother     Hypertension Paternal Grandmother     No Known Problems Paternal Grandfather     Colon Cancer Neg     Prostate Cancer Neg        There were no vitals filed for this visit.    HPI:    Chief Complaint   Patient presents with    Full Skin Exam     LOV 23-Pt presents for FBSE.  Has concerns of tiny lesions on the middle of back.  Denies dryness or itching.  Denies personal or family Hx of skin cancer.     Follow-up skin check patient concerned with  lesion right arm crusted patient on azathioprine long-term.  Has generally been cautious with sun protection.  No past history of skin cancer.    No personal or family history of skin cancer  History of Present Illness  Pierce Smith is a 59 year old male who presents with concerns about skin lesions on his back.    He has noticed several small growths on his back, which he describes as 'age spots'. These lesions have become slightly larger over time. He wants to scrub them off but acknowledges that this may not be effective. He inquires about the presence of skin tags.    He shares a method he uses to remove skin tags that he can reach, involving small rubber bands that cut off circulation, causing the tag to fall off within three days. He successfully used this method on a skin tag near his belt line.    He recalls a previous lesion that was removed and healed well, noting that he followed advice not to pick at it despite it being bothersome due to its location, which often caught on his sleeve.      Patient presents with concerns above.    Past notes/ records and appropriate/relevant lab results including pathology and past body maps reviewed. Updated and new information noted in current visit.     Patient has been in their usual state of health.  History, medications, allergies reviewed as noted.      ROS:  Denies any other systemic complaints.  No new or changeing lesions other than noted above. No fevers, chills, night sweats, unusual sun sensitivity.  No other skin complaints.        History, medications, allergies reviewed as noted.       Physical Examination:     Well-developed well-nourished patient alert oriented in no acute distress.  Exam total-body performed, including scalp, head, neck, face,nails, hair, external eyes, including conjunctival mucosa, eyelids, lips external ears, back, chest,/ breasts, axillae,  abdomen, arms, legs, palms.     Multiple light to medium brown, well marginated, uniformly  pigmented, macules and papules 6 mm and less scattered on exam. pigmented lesions examined with dermoscopy benign-appearing patterns.     Waxy tannish keratotic papules scattered, cherry-red vascular papules scattered.    See map today's date for lesions noted .      Otherwise remarkable for lesions as noted on map.  See details of examination  See Assessment /Plan for additional history and physical exam also:    Assessment / plan:    Orders Placed This Encounter   Procedures    Specimen to Pathology, Tissue [[facility] Pt to [facility] lab]       Meds & Refills for this Visit:  Requested Prescriptions      No prescriptions requested or ordered in this encounter         Encounter Diagnosis   Name Primary?    Neoplasm of uncertain behavior of skin Yes       See details on map.      Remarkable for:      Physical Exam  SKIN: Multiple small keratoses and one skin tag on the back. No suspicious lesions.    Results        Assessment & Plan  Seborrheic keratosis  Multiple small seborrheic keratoses on the back with no suspicious lesions. One keratosis may warrant cryotherapy due to prominence.  - Consider cryotherapy for prominent keratosis.    Lentigo  Age spots on the back, likely lentigines, with no concerning changes.    Benign neoplasm of skin  No suspicious skin neoplasms. Previous lesion removed and healed well. Monitoring a mole on the toe, unlikely to change or require intervention.    Encounter for long term current use of azathioprine  He is on azathioprine, which has potential cancer risks. Discussed transitioning to an injectable medication due to these risks. He is fair-skinned and at risk for skin issues regardless of medication use.  - Monitor skin for any changes or suspicious lesions.    Recording duration: 5 minutes        Patient with irregular brown pink tan plaque 1.4cm left wrist r/o atypical pigmented lesion  Shave/ tangential biopsy performed, operative note and consent in chart further plans pending  pathology    Verrucoid keratotic plaque 5 mm at right lateral dorsal wrist.  Lesions treated with cryo- .  Medically necessary as lesion inflamed and irritated.  Biopsy / reevaluation if not resolved.    Continue careful monitoring follow-up with azathioprine long-term.    Sun damage comedones at lateral orbit Favre-Racouchot Differin gel more careful sun protection.  Consider tretinoin.  No active AK's    Cystic lesion scalp observe.  Stable multiple nature angiomas reassurance.  Keratoses back chest arms reassurance.  No significant changes in pigmented lesions    Erythematous papule lumbar back observe.      Irregular patch at the left forearm likely larger lentigines unchanged  4 larger lentigo observed.  Overall stable    No atypical pigmented lesions.  Encouraged sunscreen annual follow-up   Benign keratosis left hand observe.  Pinkish patch right scapula observed benign-appearing nevus.  Please refer to map for specific lesions.  See additional diagnoses.  Pros cons of various therapies, risks benefits discussed.Pathophysiology discussed with patient.  Therapeutic options reviewed.  See  Medications in grid.  Instructions reviewed at length.    Benign nevi, seborrheic  keratoses, cherry angiomas:  Reassurance regarding other benign skin lesions.Signs and symptoms of skin cancer, ABCDE's of melanoma discussed with patient. Sunscreen use, sun protection, self exams reviewed.  Followup as noted RTC routine checkup 6 mos - one year or p.r.n.    The patient indicates understanding of these issues and agrees to the plan.  The patient is asked to return as noted in follow-up/ above.    This note was generated using Dragon voice recognition software.  Please contact me regarding any confusion resulting from errors in recognition.   Note to patient and family: The 21st Century Cures Act makes medical notes like these available to patients. However, be advised this is a medical document. It is intended as [id number]  communication and monitoring of a patient's care needs. It is written in medical language and may contain abbreviations or verbiage that are unfamiliar. It may appear blunt or direct. Medical documents are intended to carry relevant information, facts as evident and the clinical opinion of the practitioner.

## 2025-06-16 NOTE — PROGRESS NOTES
Operative Report                     Shave/  Tangential biopsy     Clinical diagnosis:    Size of lesion:    Location:Patient with irregular brown pink tan plaque 1.4cm left wrist r/o atypical pigmented lesion    Procedure:    With patient in appropriate position the skin of the above was scrubbed with alcohol.  Anesthesia was obtained with 1% Xylocaine with epinephrine.  The skin surrounding the lesion was placed under tension and the lesion was incised using a #15 scalpel blade.  The specimen was sent for histopathologic exam.    Hemostasis was obtained with electrocautery/aluminum chloride.  Estimated blood loss less than 2 cc.    Biopsy dressed with Polysporin, bandage.    Pressure dressing:   No    Complications: None    Written instructions given and reviewed with patient    Await pathology    Contact information reviewed.    Procedural physician:  Alecia Solsi MD

## 2025-06-17 ENCOUNTER — TELEPHONE (OUTPATIENT)
Facility: CLINIC | Age: 60
End: 2025-06-17

## 2025-06-17 NOTE — TELEPHONE ENCOUNTER
Jose Gerard MD  6/14/2025 12:45 PM CDT Back to Top      I spoke to Pierce.  His laboratory parameters are acceptable.  His potassium is 3.3.  He is on an ACE inhibitor/hydrochlorothiazide.  He will increase his potassium intake.  He is susceptible to hepatitis B, immune to hepatitis A.  TB testing is negative.  We will continue to facilitate obtaining Skyrizi.  I also recommended that he discuss obtaining Shingrix and hepatitis B vaccinations through his PCP.  He wrote down the vaccines recommended.     GI RNs: Please see above.  Please also fax a copy of the labs to the patient's primary care provider, LETTY Catalan.  The patient is also unable to send me [employer] messages.  Can someone assist him in being able to do so.

## 2025-06-17 NOTE — TELEPHONE ENCOUNTER
Faxed lab results (hepatitis A Ab,Igm , CBC, CMP, Hepatitis ABC profile and Quantiferon TB Plus) to [facility] at [phone] F.     Fax confirmation received at 9:58 am.    As per the [facility] policy, if the patient has not been seen in the office for more than a year, the patient cannot send a [facility] message.    Sent message to the patient in the MyChart for patient to send messages to provider.

## 2025-06-27 ENCOUNTER — TELEPHONE (OUTPATIENT)
Facility: CLINIC | Age: 60
End: 2025-06-27

## 2025-06-27 ENCOUNTER — OFFICE VISIT (OUTPATIENT)
Dept: SURGERY | Facility: CLINIC | Age: 60
End: 2025-06-27
Payer: MEDICAID

## 2025-06-27 VITALS
RESPIRATION RATE: 16 BRPM | BODY MASS INDEX: 32 KG/M2 | DIASTOLIC BLOOD PRESSURE: 84 MMHG | OXYGEN SATURATION: 97 % | HEART RATE: 72 BPM | SYSTOLIC BLOOD PRESSURE: 145 MMHG | WEIGHT: 233 LBS

## 2025-06-27 DIAGNOSIS — D03.62 MELANOMA IN SITU OF LEFT UPPER EXTREMITY INCLUDING SHOULDER (HCC): Primary | ICD-10-CM

## 2025-06-27 PROCEDURE — 99205 OFFICE O/P NEW HI 60 MIN: CPT | Performed by: SURGERY

## 2025-06-27 NOTE — PATIENT INSTRUCTIONS
Surgery: Wide local excision of melanoma in situ    Date of Surgery: TBD    Surgery Length: 30 minutes    Anesthesia: [x]Local   []MAC  []General    Hospital:    [facility]- [address], [place] [address]   Phone: [phone]. Pre-Admission Testing (Arrival time and instructions): [phone]    [facility] [address] [place] [address]  Phone: [phone]. Pre-Admission Testing (Arrival time and instructions): [phone]    This is an outpatient procedure.  Use the provided Chlorhexadine surgical soap(instructions attached) to shower the night before and morning of your procedure.  Do not apply powders, creams, lotions or deodorant after showering.  Do not apply any kind of makeup and make sure to remove nail polish prior to your surgery.  Bring your picture ID and insurance card with you.  Wear comfortable clothing that can easily be removed. Preferably, something that zips, snaps, or buttons up the front.   You will be contacted by the hospital the day prior to your surgery to confirm details and give you specific instructions about when and where to arrive the day of your procedure.   If you are taking blood thinners including: Plavix, Eliquis, Coumadin you will need to contact the prescribing provider for specific instructions on holding these medications for your procedure  Motrin, Advil, Ibuprofen, Aspirin, Baby Aspirin and Fish Oil are also blood thinners and need to be held at least one week prior to your procedure. It is okay to take Tylenol.  Inform your primary care physician of your surgery and ask if him/her will need to see you prior to surgery.      Pre-Operative Testing   CBC  CMP  BMP    PT, PTT, INR  UA  EKG    Chest X-Ray  Cardiac Clearance  [facility] Medical Clearance     Does patient have pacemaker: [] YES [x] No Does patient have JAUN:  [] YES [x] No  Is patient diabetic?  [] YES    [x] NO    Please call PCP/specialty physician to schedule pre-op exam  for medical clearance.  PCP/specialty physician to fax clearance to office 2 weeks prior to surgery.     Jabari Alarcon MD  [facility]  System Medical Director of Surgical Services  [facility]    For Dr. Alarcon's office: [phone]/ Fax: [phone]  After hours you will reach the answering service    Dr. Alarcon's nurse; Jamila RN:  [phone] (voicemail)  Monday through Friday 8:30 am to 4:30 pm     Central [phone] [place]bin [phone] [facility]  Medical Records:   [phone]

## 2025-06-27 NOTE — TELEPHONE ENCOUNTER
Elijah from [facility] is requesting for additional information for quantity of the medicine for skyrizi for prior authorization please follow up

## 2025-06-27 NOTE — CONSULTS
[facility]      Patient Name:  Pierce Smith   YOB: 1965   Gender:  Male   Appt Date:  6/27/2025   Provider:  Jabari Alarcon MD   Insurance:  [facility] PLANS     PATIENT PROVIDERS  Referring Provider: No ref. provider found   Address: No referring provider defined for this encounter.   Phone #: N/A    Primary Care Provider:LETTY Wren   Address: [address] [id number]   Phone #: [phone]       CHIEF COMPLAINT  No chief complaint on file.  Consult     PROBLEMS  Reviewed Problem List[1]     History of Present Illness:  Pierce Smith is a 59 year old mlae with PMHx Crohn's disease, HTN, HL, small bowel resection (2013), who is referred by Dr. 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     Patient presented to dermatologist for full skin check. An irregular brown pink tan plaque measuring 1.4 cm noted on left wrist. Shave biopsy obtained on 6/12/2025. Pathology, detailed below, shows melanoma in situ arising in a nevus.   No personal or family history of skin cancers.       Vital Signs:  There were no vitals taken for this visit.     Medications Reviewed:  Medications - Current[2]     Allergies Reviewed:  Allergies[3]     History:  Reviewed:  Past Medical History[4]   Reviewed:  Past Surgical History[5]   Reviewed Social History:  Social Hx on file[6]   Reviewed:  Family History[7]     Review of Systems:  Review of Systems   Constitutional:  Negative for activity change, appetite change, chills, fever and unexpected weight change.   HENT:  Negative for nosebleeds and trouble swallowing.    Eyes:  Negative for discharge and redness.   Respiratory:  Negative for cough, chest tightness and shortness of breath.    Cardiovascular:  Negative for chest pain and leg swelling.   Gastrointestinal:  Negative for abdominal distention, abdominal pain, blood in stool,  nausea and vomiting.   Endocrine: Negative for polydipsia and polyphagia.   Genitourinary:  Negative for difficulty urinating, dysuria and hematuria.   Musculoskeletal:  Negative for myalgias.   Skin:  Negative for color change and pallor.   Allergic/Immunologic: Negative for immunocompromised state.   Neurological:  Negative for syncope, speech difficulty, weakness and numbness.   Hematological:  Does not bruise/bleed easily.   Psychiatric/Behavioral:  Negative for agitation and confusion.         Physical Examination:  Physical Exam  Constitutional:       General: He is not in acute distress.     Appearance: He is well-developed. He is not diaphoretic.   HENT:      Head: Normocephalic and atraumatic.   Eyes:      General: No scleral icterus.     Pupils: Pupils are equal, round, and reactive to light.   Neck:      Thyroid: No thyromegaly.   Cardiovascular:      Rate and Rhythm: Normal rate and regular rhythm.      Heart sounds: No murmur heard.  Pulmonary:      Effort: Pulmonary effort is normal. No respiratory distress.   Abdominal:      General: There is no distension.      Palpations: Abdomen is soft.      Tenderness: There is no abdominal tenderness.   Musculoskeletal:         General: Normal range of motion.   Skin:     General: Skin is warm and dry.      Coloration: Skin is not jaundiced.   Neurological:      Mental Status: He is alert and oriented to person, place, and time.   Psychiatric:         Mood and Affect: Mood normal.         Behavior: Behavior normal. Behavior is cooperative.        Case Report     Surgical Pathology                                Case: [id number]                                     Authorizing Provider:  Alecia Solis MD        Collected:           06/12/2025 04:05 PM             Ordering Location:     [facility]    Received:            06/12/2025 04:05 PM                                    [facility]                                                                                     [place]                                                                         Pathologist:           Marilee France MD                                                               Specimen:    Wrist, left                                                                                    Final Diagnosis:     Skin, left wrist, shave biopsy:  - Melanoma in situ arising in a nevus.  - See comment.     Electronically signed by Marilee France MD on 6/17/2025 at 1435 CDT        Final Diagnosis Comment      Sections show a compound melanocytic proliferation.  The junctional component is formed of nests of melanocytes with variation in size and distribution and areas of fusion, as well as focal single cell lentiginous growth.  The melanocytes have moderate amphophilic cytoplasm and occasional enlarged hyperchromatic nuclei.  Upward melanocytic migration is not conspicuous.  The dermal component is formed of few small nests and single melanocytes with maturation towards the deep aspect.  An immunostain for PRAME is mostly positive in the junctional melanocytes.     The findings are consistent with melanoma in situ arising in a nevus.  The tumor involves the peripheral margin.  A re-excision with appropriate margins is recommended.     Ancillary Studies      Immunohistochemistry:     Material:  Block A1  Population:  Tissue     Antibody                        Result  PRAME                           See Comment     Medical Necessity    Immunohistochemical stains were performed:                See Comment                 Positive tissue controls were utilized in the staining process.  These slides were reviewed by the signout Pathologist and showed appropriate staining results.     Interpreted by:  Marilee France M.D.     Methodology:   Immunohistochemical stains are performed on formalin-fixed, paraffin-embedded tissue sections.  Deparaffinization, antigen retrieval, and staining utilizes the automated  Leica Cruz III immunohistochemistry platform.  A proprietary, non-biotin, polymer-based detection system (Bond Polymer Refine DetectionTM ) is employed.  All antibodies are validated by [facility] Department of Pathology to document appropriate staining reactions.  Positive controls are utilized and show appropriate reactivity.     Clinical Information      D48.5 Neoplasm Of Uncertain Behavior Of Skin.        Gross Description      A.  The specimen is received in formalin, labeled with the patient's name, MRN and \" breast, left\".   It consists of a 0.9 x 0.7 x 0.1 cm shave biopsy of variegated tan to brown skin. The deep surface is inked blue. The specimen is submitted intact in A1 to be cut by histology. (JS)     Interpretation     Abnormal Abnormal        Assessment / Plan:    MIS left wrist  Recommend wide local excision  Counseled about risks of surgery including not limited to bleeding, infection  Patient agreeable wishes to proceed    Jabari Alarcon MD  Complex General Surgical Oncology  System Medical Director of Surgical Services  [facility]       Follow Up:  No follow-ups on file.       Electronically Signed by: Jabari Alarcon MD          [1]   Patient Active Problem List  Diagnosis    Primary hypertension    Obesity    Hypertriglyceridemia    Hyperplasia of prostate without lower urinary tract symptoms (LUTS)    Elevated hemoglobin    Seborrheic keratosis    Crohn's disease (HCC)    Cherry angioma    Health maintenance examination    Snoring    Screening for colorectal cancer   [2]   Current Outpatient Medications:     Risankizumab-rzaa (SKYRIZI) 360 MG/2.4ML Subcutaneous Solution Cartridge, Inject 1 Cartridge into the skin Every 8 (eight) weeks. Inject 360 mg on week 12 and then every 8 weeks thereafter, Disp: 1 each, Rfl: 5    azaTHIOprine 50 MG Oral Tab, TAKE 5 TABLETS BY MOUTH DAILY, Disp: 450 tablet, Rfl: 1    atorvastatin 10 MG Oral Tab, Take 1 tablet (10 mg total) by mouth  in the morning., Disp: , Rfl:     AMLODIPINE 5 MG Oral Tab, TAKE 1 TABLET BY MOUTH DAILY, Disp: 90 tablet, Rfl: 0    LISINOPRIL-HYDROCHLOROTHIAZIDE 20-25 MG Oral Tab, TAKE 1 TABLET BY MOUTH EVERY DAY, Disp: 90 tablet, Rfl: 0  [3]   Allergies  Allergen Reactions    Penicillin G RASH   [4]   Past Medical History:   Crohn's disease (HCC)    Essential hypertension    Hyperlipidemia    Hyperplasia of prostate without lower urinary tract symptoms (LUTS)    Hypertriglyceridemia    Melanoma in situ (HCC)    mis arising in a nevus    Primary hypertension   [5]   Past Surgical History:  Procedure Laterality Date    Appendectomy  2013    Bowel resection      Small bowel resection    Colectomy  2013    with takedown of splenic flexure    Colonoscopy      Colonoscopy N/A 2019    Procedure: COLONOSCOPY;  Surgeon: Jose Gerard MD;  Location: [facility] ENDOSCOPY    Colonoscopy N/A 2025    Procedure: COLONOSCOPY with biopsy;  Surgeon: Jose Gerard MD;  Location: [facility] ENDOSCOPY    Other surgical history      Cystoscopy   [6]   Social History  Socioeconomic History    Marital status: Single   Tobacco Use    Smoking status: Former     Current packs/day: 0.00     Average packs/day: 0.5 packs/day for 5.0 years (2.5 ttl pk-yrs)     Types: Cigarettes     Start date: 1995     Quit date: 2000     Years since quittin.8    Smokeless tobacco: Never   Vaping Use    Vaping status: Never Used   Substance and Sexual Activity    Alcohol use: Yes     Alcohol/week: 1.0 standard drink of alcohol     Types: 1 Cans of beer per week     Comment: very mild    Drug use: No    Sexual activity: Not Currently     Partners: Female   Other Topics Concern    Caffeine Concern No    Stress Concern No    Weight Concern Yes    Special Diet No    Exercise Yes    Seat Belt Yes    Pt has a pacemaker No    Pt has a defibrillator No    Reaction to local anesthetic No   [7]   Family History  Problem Relation Age of Onset     Hypertension Mother     Diabetes Mother     Endocrine Disorder Mother         Zollinger-carrasco syndrome    Hypertension Father     Heart Attack Father         MI    No Known Problems Sister     No Known Problems Sister     Crohn's Disease Sister     No Known Problems Brother     No Known Problems Brother     Other (MVA) Brother     No Known Problems Maternal Grandmother     No Known Problems Maternal Grandfather     Obesity Paternal Grandmother     Hypertension Paternal Grandmother     No Known Problems Paternal Grandfather     Colon Cancer Neg     Prostate Cancer Neg

## 2025-06-27 NOTE — H&P (VIEW-ONLY)
[facility]      Patient Name:  Pierce Smith   YOB: 1965   Gender:  Male   Appt Date:  6/27/2025   Provider:  Jabari Alarcon MD   Insurance:  [facility] PLANS     PATIENT PROVIDERS  Referring Provider: No ref. provider found   Address: No referring provider defined for this encounter.   Phone #: N/A    Primary Care Provider:LETTY Wren   Address: [address] [id number]   Phone #: [phone]       CHIEF COMPLAINT  No chief complaint on file.  Consult     PROBLEMS  Reviewed Problem List[1]     History of Present Illness:  Pierce Smith is a 59 year old mlae with PMHx Crohn's disease, HTN, HL, small bowel resection (2013), who is referred by Dr. 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     Patient presented to dermatologist for full skin check. An irregular brown pink tan plaque measuring 1.4 cm noted on left wrist. Shave biopsy obtained on 6/12/2025. Pathology, detailed below, shows melanoma in situ arising in a nevus.   No personal or family history of skin cancers.       Vital Signs:  There were no vitals taken for this visit.     Medications Reviewed:  Medications - Current[2]     Allergies Reviewed:  Allergies[3]     History:  Reviewed:  Past Medical History[4]   Reviewed:  Past Surgical History[5]   Reviewed Social History:  Social Hx on file[6]   Reviewed:  Family History[7]     Review of Systems:  Review of Systems   Constitutional:  Negative for activity change, appetite change, chills, fever and unexpected weight change.   HENT:  Negative for nosebleeds and trouble swallowing.    Eyes:  Negative for discharge and redness.   Respiratory:  Negative for cough, chest tightness and shortness of breath.    Cardiovascular:  Negative for chest pain and leg swelling.   Gastrointestinal:  Negative for abdominal distention, abdominal pain, blood in stool,  nausea and vomiting.   Endocrine: Negative for polydipsia and polyphagia.   Genitourinary:  Negative for difficulty urinating, dysuria and hematuria.   Musculoskeletal:  Negative for myalgias.   Skin:  Negative for color change and pallor.   Allergic/Immunologic: Negative for immunocompromised state.   Neurological:  Negative for syncope, speech difficulty, weakness and numbness.   Hematological:  Does not bruise/bleed easily.   Psychiatric/Behavioral:  Negative for agitation and confusion.         Physical Examination:  Physical Exam  Constitutional:       General: He is not in acute distress.     Appearance: He is well-developed. He is not diaphoretic.   HENT:      Head: Normocephalic and atraumatic.   Eyes:      General: No scleral icterus.     Pupils: Pupils are equal, round, and reactive to light.   Neck:      Thyroid: No thyromegaly.   Cardiovascular:      Rate and Rhythm: Normal rate and regular rhythm.      Heart sounds: No murmur heard.  Pulmonary:      Effort: Pulmonary effort is normal. No respiratory distress.   Abdominal:      General: There is no distension.      Palpations: Abdomen is soft.      Tenderness: There is no abdominal tenderness.   Musculoskeletal:         General: Normal range of motion.   Skin:     General: Skin is warm and dry.      Coloration: Skin is not jaundiced.   Neurological:      Mental Status: He is alert and oriented to person, place, and time.   Psychiatric:         Mood and Affect: Mood normal.         Behavior: Behavior normal. Behavior is cooperative.        Case Report     Surgical Pathology                                Case: [id number]                                     Authorizing Provider:  Alecia Solis MD        Collected:           06/12/2025 04:05 PM             Ordering Location:     [facility]    Received:            06/12/2025 04:05 PM                                    [facility]                                                                                     [place]                                                                         Pathologist:           Marilee France MD                                                               Specimen:    Wrist, left                                                                                    Final Diagnosis:     Skin, left wrist, shave biopsy:  - Melanoma in situ arising in a nevus.  - See comment.     Electronically signed by Marilee France MD on 6/17/2025 at 1435 CDT        Final Diagnosis Comment      Sections show a compound melanocytic proliferation.  The junctional component is formed of nests of melanocytes with variation in size and distribution and areas of fusion, as well as focal single cell lentiginous growth.  The melanocytes have moderate amphophilic cytoplasm and occasional enlarged hyperchromatic nuclei.  Upward melanocytic migration is not conspicuous.  The dermal component is formed of few small nests and single melanocytes with maturation towards the deep aspect.  An immunostain for PRAME is mostly positive in the junctional melanocytes.     The findings are consistent with melanoma in situ arising in a nevus.  The tumor involves the peripheral margin.  A re-excision with appropriate margins is recommended.     Ancillary Studies      Immunohistochemistry:     Material:  Block A1  Population:  Tissue     Antibody                        Result  PRAME                           See Comment     Medical Necessity    Immunohistochemical stains were performed:                See Comment                 Positive tissue controls were utilized in the staining process.  These slides were reviewed by the signout Pathologist and showed appropriate staining results.     Interpreted by:  Marilee France M.D.     Methodology:   Immunohistochemical stains are performed on formalin-fixed, paraffin-embedded tissue sections.  Deparaffinization, antigen retrieval, and staining utilizes the automated  Leica Cruz III immunohistochemistry platform.  A proprietary, non-biotin, polymer-based detection system (Bond Polymer Refine DetectionTM ) is employed.  All antibodies are validated by [facility] Department of Pathology to document appropriate staining reactions.  Positive controls are utilized and show appropriate reactivity.     Clinical Information      D48.5 Neoplasm Of Uncertain Behavior Of Skin.        Gross Description      A.  The specimen is received in formalin, labeled with the patient's name, MRN and \" breast, left\".   It consists of a 0.9 x 0.7 x 0.1 cm shave biopsy of variegated tan to brown skin. The deep surface is inked blue. The specimen is submitted intact in A1 to be cut by histology. (JS)     Interpretation     Abnormal Abnormal        Assessment / Plan:    MIS left wrist  Recommend wide local excision  Counseled about risks of surgery including not limited to bleeding, infection  Patient agreeable wishes to proceed    Jabari Alarcon MD  Complex General Surgical Oncology  System Medical Director of Surgical Services  [facility]       Follow Up:  No follow-ups on file.       Electronically Signed by: Jabari Alarcon MD          [1]   Patient Active Problem List  Diagnosis    Primary hypertension    Obesity    Hypertriglyceridemia    Hyperplasia of prostate without lower urinary tract symptoms (LUTS)    Elevated hemoglobin    Seborrheic keratosis    Crohn's disease (HCC)    Cherry angioma    Health maintenance examination    Snoring    Screening for colorectal cancer   [2]   Current Outpatient Medications:     Risankizumab-rzaa (SKYRIZI) 360 MG/2.4ML Subcutaneous Solution Cartridge, Inject 1 Cartridge into the skin Every 8 (eight) weeks. Inject 360 mg on week 12 and then every 8 weeks thereafter, Disp: 1 each, Rfl: 5    azaTHIOprine 50 MG Oral Tab, TAKE 5 TABLETS BY MOUTH DAILY, Disp: 450 tablet, Rfl: 1    atorvastatin 10 MG Oral Tab, Take 1 tablet (10 mg total) by mouth  in the morning., Disp: , Rfl:     AMLODIPINE 5 MG Oral Tab, TAKE 1 TABLET BY MOUTH DAILY, Disp: 90 tablet, Rfl: 0    LISINOPRIL-HYDROCHLOROTHIAZIDE 20-25 MG Oral Tab, TAKE 1 TABLET BY MOUTH EVERY DAY, Disp: 90 tablet, Rfl: 0  [3]   Allergies  Allergen Reactions    Penicillin G RASH   [4]   Past Medical History:   Crohn's disease (HCC)    Essential hypertension    Hyperlipidemia    Hyperplasia of prostate without lower urinary tract symptoms (LUTS)    Hypertriglyceridemia    Melanoma in situ (HCC)    mis arising in a nevus    Primary hypertension   [5]   Past Surgical History:  Procedure Laterality Date    Appendectomy  2013    Bowel resection      Small bowel resection    Colectomy  2013    with takedown of splenic flexure    Colonoscopy      Colonoscopy N/A 2019    Procedure: COLONOSCOPY;  Surgeon: Jose Gerard MD;  Location: [facility] ENDOSCOPY    Colonoscopy N/A 2025    Procedure: COLONOSCOPY with biopsy;  Surgeon: Jose Gerard MD;  Location: [facility] ENDOSCOPY    Other surgical history      Cystoscopy   [6]   Social History  Socioeconomic History    Marital status: Single   Tobacco Use    Smoking status: Former     Current packs/day: 0.00     Average packs/day: 0.5 packs/day for 5.0 years (2.5 ttl pk-yrs)     Types: Cigarettes     Start date: 1995     Quit date: 2000     Years since quittin.8    Smokeless tobacco: Never   Vaping Use    Vaping status: Never Used   Substance and Sexual Activity    Alcohol use: Yes     Alcohol/week: 1.0 standard drink of alcohol     Types: 1 Cans of beer per week     Comment: very mild    Drug use: No    Sexual activity: Not Currently     Partners: Female   Other Topics Concern    Caffeine Concern No    Stress Concern No    Weight Concern Yes    Special Diet No    Exercise Yes    Seat Belt Yes    Pt has a pacemaker No    Pt has a defibrillator No    Reaction to local anesthetic No   [7]   Family History  Problem Relation Age of Onset     Hypertension Mother     Diabetes Mother     Endocrine Disorder Mother         Zollinger-carrasco syndrome    Hypertension Father     Heart Attack Father         MI    No Known Problems Sister     No Known Problems Sister     Crohn's Disease Sister     No Known Problems Brother     No Known Problems Brother     Other (MVA) Brother     No Known Problems Maternal Grandmother     No Known Problems Maternal Grandfather     Obesity Paternal Grandmother     Hypertension Paternal Grandmother     No Known Problems Paternal Grandfather     Colon Cancer Neg     Prostate Cancer Neg

## 2025-06-27 NOTE — TELEPHONE ENCOUNTER
I spoke to Mariann from [employer] ([phone])    Provided information requested, one every 56 days.    The above information was relayed to the clinical review team    Reference #[id number]

## 2025-06-30 ENCOUNTER — DOCUMENTATION ONLY (OUTPATIENT)
Facility: CLINIC | Age: 60
End: 2025-06-30

## 2025-06-30 NOTE — PROGRESS NOTES
Date of Surgery: TBD    Diagnosis: Melanoma in situ of left upper extremity including shoulder, D03.62     Procedure: Wide local excision of melanoma in situ     Location: [] [facility]  [] [facility]  [] [facility]    Type: [] Inpatient  [x] Outpatient  [] 23-hour observation    Number of days inpatient: N/A    Length of Surgery: 30 minutes    Anesthesia: [x] Local  [] MAC [] General  [] Pec Block     Joint case with: [] Yes,   [x] No   Needs SA:  [x] Yes  [] No    Position/Special Equipment:    Penicillin Allergy: [x] Yes [] No   Nickel Allergy: [] Yes [x] No   Latex Allergy: [] Yes [x] No    Orders:  Colon Bundle/Bowel Prep: [] Yes  [x] No    Type and Cross 2 units PRBC: [] Yes [x] No    Type and Screen: [] Yes [x] No    Advanced Oncology Order Set (HIPEC): [] Yes [x] No    Heparin Pre-op (Heparin 5000 units subQ x 1 dose): [] Yes  [x] No    Nuclear Med Injection: [] Yes  [x] No    Wire localization needed: [] Yes [x] No    Midline placement (HIPEC,Whipple, Esophagectomy, Liver): [] Yes [x] No    CHG Cloths (HIPEC, Whipple, Esophagectomy, Liver): [] Yes [x] No    Tylenol administration prior to surgery: [] Yes [x] No    Does patient have a pacemaker: [] Yes [x] No  Sleep Apnea: [] Yes  [x] No      Is patient diabetic: [] Yes, if so, no Ensure/yes to Sugar free Gatorade [x] No    Antibiotics: [] /EM prophylactic antibiotic protocol [x] No need of antibiotics    PAT Orders: []CBC  []CMP []EKG  []CT Scan []Chest X-ray

## 2025-07-03 ENCOUNTER — TELEPHONE (OUTPATIENT)
Dept: SURGERY | Facility: CLINIC | Age: 60
End: 2025-07-03

## 2025-07-03 NOTE — TELEPHONE ENCOUNTER
Notified patient of surgery Wide local excision of melanoma in situ  scheduled for 7/14/25 at [place]. Patient agreeable to surgery date and place. Advised patient to call back regarding any questions or concerns. Patient verbalized understanding.

## 2025-07-07 DIAGNOSIS — D03.62 MELANOMA IN SITU OF LEFT UPPER EXTREMITY INCLUDING SHOULDER (HCC): ICD-10-CM

## 2025-07-07 DIAGNOSIS — D03.62 MELANOMA IN SITU OF LEFT UPPER EXTREMITY (HCC): ICD-10-CM

## 2025-07-14 ENCOUNTER — TELEPHONE (OUTPATIENT)
Facility: CLINIC | Age: 60
End: 2025-07-14

## 2025-07-14 DIAGNOSIS — D03.62 MELANOMA IN SITU OF LEFT UPPER EXTREMITY INCLUDING SHOULDER (HCC): Primary | ICD-10-CM

## 2025-07-14 NOTE — TELEPHONE ENCOUNTER
Called patient regarding update received from LETTY Espitia stating patient called and cancelled surgery today due to he is sick, including fever. This RN called patient to confirm surgery being rescheduled to 7/23/25 at [place]. No answer, left message requesting patient to call back.

## 2025-07-21 NOTE — DISCHARGE INSTRUCTIONS
Wide Excision Discharge Instructions  Thank you for choosing the Surgical Oncology team at [facility].        Managing Your Pain  Follow the guidelines below to help manage your pain at home:  Take your medications as directed and as needed. You may also take 1000 mg of acetaminophen (Tylenol®) every 6 hours as needed for pain.  Call our office if the medication prescribed for you does not ease your pain.  Don't drive or drink alcohol while you're taking prescription pain medications.  Keep track of when you take your pain medication. It works best 30 to 45 minutes after you take it.   Caring for Your Incision  It's normal for the skin below your incision to feel numb. This happens because some of the nerves were cut during your surgery. The numbness will go away over time.  Leave the dressing on for 48 hours after surgery. The clear outer covering (tegaderm) can then come off.   Your surgeon used dissolvable sutures (stitches) underneath your skin, and they will not need to be removed.   The Steri-Strips (small white adhesive strips) on your incision will loosen and fall off by themselves. If they haven't fallen off within 14 days, you can take them off.  If the area around your incision is red, puffy, or if you have any drainage from your incision, contact our office.  Showering  You may shower 48 hours after surgery. When you shower, use soap to gently wash your incision. After you shower, pat the area dry with a clean towel. Don't rub over your incision. Leave your incision uncovered, unless there's drainage.  Avoid tub baths until your surgeon says it's okay.  Activity  After the procedure, take it easy for the rest of the day.  If you had general anesthesia, don't use machinery or power tools, drink alcohol, or make any major decisions for at least the first 24 hours.  Avoid lifting more than 10 pounds with your affected arm for at least 4 weeks  Avoid rigorous movement with affected arm for at  least 4 weeks     When to Call:  Contact our office if you experience the following symptoms:  Fever of 100.4° F (38°C) or higher  Cloudy or smelly drainage from the incision site  The skin around your incision is warm/red/swollen  Sudden increase in pain or new pain  Shaking chills  Fast pulse  Shortness of breath or chest pain  Nausea or vomiting   Diarrhea  Constipation that isn't relieved in 2 days  Any new or unexplained symptoms    We will call you to set up a follow-up appointment.    Please call us at [phone] if you are unable to make it to your appointment or if you have any questions.

## 2025-07-23 ENCOUNTER — PATIENT MESSAGE (OUTPATIENT)
Facility: CLINIC | Age: 60
End: 2025-07-23

## 2025-07-23 ENCOUNTER — TELEPHONE (OUTPATIENT)
Facility: CLINIC | Age: 60
End: 2025-07-23

## 2025-07-23 ENCOUNTER — HOSPITAL ENCOUNTER (OUTPATIENT)
Facility: HOSPITAL | Age: 60
Setting detail: HOSPITAL OUTPATIENT SURGERY
Discharge: HOME | End: 2025-07-23
Attending: SURGERY | Admitting: SURGERY

## 2025-07-23 VITALS
DIASTOLIC BLOOD PRESSURE: 71 MMHG | TEMPERATURE: 98 F | SYSTOLIC BLOOD PRESSURE: 123 MMHG | BODY MASS INDEX: 31.83 KG/M2 | HEIGHT: 72 IN | RESPIRATION RATE: 18 BRPM | OXYGEN SATURATION: 96 % | HEART RATE: 55 BPM | WEIGHT: 235 LBS

## 2025-07-23 DIAGNOSIS — D03.62 MELANOMA IN SITU OF LEFT UPPER EXTREMITY (HCC): ICD-10-CM

## 2025-07-23 PROCEDURE — 88305 TISSUE EXAM BY PATHOLOGIST: CPT | Performed by: SURGERY

## 2025-07-23 PROCEDURE — 88341 IMHCHEM/IMCYTCHM EA ADD ANTB: CPT | Performed by: SURGERY

## 2025-07-23 PROCEDURE — 88342 IMHCHEM/IMCYTCHM 1ST ANTB: CPT | Performed by: SURGERY

## 2025-07-23 RX ORDER — HYDROCODONE BITARTRATE AND ACETAMINOPHEN 5; 325 MG/1; MG/1
1-2 TABLET ORAL EVERY 4 HOURS PRN
Qty: 5 TABLET | Refills: 0 | Status: SHIPPED | OUTPATIENT
Start: 2025-07-23

## 2025-07-23 RX ORDER — AZATHIOPRINE 50 MG/1
250 TABLET ORAL NIGHTLY
Qty: 150 TABLET | Refills: 1 | Status: SHIPPED | OUTPATIENT
Start: 2025-07-23

## 2025-07-23 NOTE — TELEPHONE ENCOUNTER
GI RNs: I will refill the azathioprine x 2.  I spoke to the health plan.  They advised that we fax an appeal letter.  I have dictated an appeal letter for the [facility].  The patient will also need to sign the authorized representative designation so that we may represent him on this appeal.

## 2025-07-23 NOTE — TELEPHONE ENCOUNTER
Called and spoke to the patient, date of birth and name verified.    The patient will come to the office to sign the [facility] Authorized Representative Designation form.    Will fax the above form to the number provided on the form when signed.

## 2025-07-23 NOTE — INTERVAL H&P NOTE
Patient seen and examined. No changes to the attached history and physical are noted.     59 year old male presenting today for planned WLE.    Jabari Alarcon MD  [facility] General Surgical Oncology  [facility]  Pager [phone]  [email or web address]

## 2025-07-23 NOTE — TELEPHONE ENCOUNTER
Pt stopped by today and left a letter for  .  Pt unsure if he had to stay on the Azathioprine after the Skyrizzi...  Pt got Mychart but is unable to leave a message..    Letter at  GI bin [facility]

## 2025-07-23 NOTE — TELEPHONE ENCOUNTER
Dr Garcia    Called and spoke to the patient, date of birth and name verified.    He is requesting a refill for the azathioprine.    Last OV: 5/20/2025 (colonoscopy)  Last refill: 12/17/2024       D:450         R:1    Updated on plan to appeal for the Skyrizi subcutaneous.    Patient's letter placed on your desk.    Thank you

## 2025-07-23 NOTE — OPERATIVE REPORT
Wide Local Excision for Primary Cutaneous Melanoma - Excision 1 (Wrist - Left)  Operation performed with curative intent Yes   Original Breslow thickness of the lesion  Melanoma in situ (MIS)   Clinical margin width  (measured from the edge of the lesion or the prior excision scar) 0.5 cm   Depth of excision Full-thickness skin/subcutaneous tissue down to fascia (melanoma)   Date of operation 7/23/2025    Preoperative diagnosis:  1.  Left wrist melanoma in situ    Operations performed: #1 wide local excision, melanoma in situ of the left wrist, specimen measured 5 x 2 cm  2.  Complex closure, 5 x 2 cm defect of the left wrist    Postoperative diagnosis:  1.  Same    Operating surgeon:  1.  Jabari Alarcon MD    Assistant:  1.PA: Juliana Solomon PA     Indications: 59-year-old gentleman who was diagnosed with melanoma in situ of the left wrist.  He presents for definitive management.    Details of the operation:  Patient was brought to the operating room laid supine the operating table.  The left wrist and upper extremity were prepped and draped in standard sterile manner.  Timeout is completed to verify name, procedure be performed and laterality.  Everybody in the room was in agreement.  Total of 10 cc of 1% lidocaine intermixed with 0.5% Marcaine with epinephrine were infiltrated.  A 15 blade was used to incise the skin this was carried through subcutaneous tissue towards the fascia.  The specimen was gradually set off its underlying attachments.  It was excised sent off for final pathological analysis after orientation.  Hemostasis was excellent.  Flaps were raised anteriorly and posteriorly in effort to close the wound with minimal tension.  The wound was then closed in 2 layers: 3-0 Vicryl interop subcutaneous and 4-0 Vicryl running subcuticular.  Patient tolerated the procedure well.  I was present for the entire case.    Jabari Alarcon MD  Complex General Surgical Oncology  System Medical Director of Surgical  Services  [place], [facility]

## 2025-07-24 ENCOUNTER — TELEPHONE (OUTPATIENT)
Dept: SURGERY | Facility: CLINIC | Age: 60
End: 2025-07-24

## 2025-07-24 NOTE — TELEPHONE ENCOUNTER
Post op call made to patient. Patient states he is doing \"pretty good.\" Denies fevers, no nausea or vomiting. States no pain. Patient is tolerating activity without complaints. No concerns with surgical sites. Wound care instructions provided. Path is still pending.    Post op appointment scheduled with LETTY Espitia at 9:15 am on 8/7/25 at [place].    Patient agrees to call if any problems or concerns.

## 2025-07-31 ENCOUNTER — TELEPHONE (OUTPATIENT)
Dept: SURGERY | Facility: CLINIC | Age: 60
End: 2025-07-31

## 2025-08-04 ENCOUNTER — OFFICE VISIT (OUTPATIENT)
Facility: LOCATION | Age: 60
End: 2025-08-04
Attending: REGISTERED NURSE

## 2025-08-04 VITALS
WEIGHT: 237 LBS | DIASTOLIC BLOOD PRESSURE: 70 MMHG | SYSTOLIC BLOOD PRESSURE: 115 MMHG | TEMPERATURE: 98 F | RESPIRATION RATE: 16 BRPM | HEART RATE: 50 BPM | BODY MASS INDEX: 32 KG/M2 | OXYGEN SATURATION: 96 %

## 2025-08-04 DIAGNOSIS — K50.919 CROHN'S DISEASE WITH COMPLICATION, UNSPECIFIED GASTROINTESTINAL TRACT LOCATION (HCC): Primary | ICD-10-CM

## 2025-08-04 LAB
ALBUMIN SERPL-MCNC: 4.7 G/DL (ref 3.2–4.8)
ALP LIVER SERPL-CCNC: 51 U/L (ref 45–117)
ALT SERPL-CCNC: 13 U/L (ref 10–49)
AST SERPL-CCNC: 21 U/L (ref ?–34)
BILIRUB DIRECT SERPL-MCNC: 0.1 MG/DL (ref ?–0.3)
BILIRUB SERPL-MCNC: 0.4 MG/DL (ref 0.2–1.1)
PROT SERPL-MCNC: 7.3 G/DL (ref 5.7–8.2)

## 2025-08-06 ENCOUNTER — RESULTS FOLLOW-UP (OUTPATIENT)
Facility: LOCATION | Age: 60
End: 2025-08-06

## 2025-08-06 DIAGNOSIS — K50.80 CROHN'S DISEASE OF BOTH SMALL AND LARGE INTESTINE WITHOUT COMPLICATION (HCC): Primary | ICD-10-CM

## 2025-08-07 ENCOUNTER — OFFICE VISIT (OUTPATIENT)
Facility: CLINIC | Age: 60
End: 2025-08-07

## 2025-08-07 VITALS
OXYGEN SATURATION: 96 % | WEIGHT: 238.63 LBS | BODY MASS INDEX: 32 KG/M2 | RESPIRATION RATE: 16 BRPM | HEART RATE: 57 BPM | DIASTOLIC BLOOD PRESSURE: 76 MMHG | SYSTOLIC BLOOD PRESSURE: 123 MMHG | TEMPERATURE: 98 F

## 2025-08-07 DIAGNOSIS — D03.62 MELANOMA IN SITU OF LEFT UPPER EXTREMITY INCLUDING SHOULDER (HCC): Primary | ICD-10-CM

## 2025-08-07 PROCEDURE — 99024 POSTOP FOLLOW-UP VISIT: CPT

## (undated) DIAGNOSIS — I10 ESSENTIAL HYPERTENSION: ICD-10-CM

## (undated) DEVICE — MARKER SUR SKIN ST GENTIAN VLT STD REG TIP

## (undated) DEVICE — Device

## (undated) DEVICE — GLOVE SUR 7 SENSICARE PI PIP CRM PWD F

## (undated) DEVICE — KIT ENDO ORCAPOD 160/180/190

## (undated) DEVICE — HANDLE SUCT REG CAP FLANGETIP SMOOTH YANK

## (undated) DEVICE — Device: Brand: DEFENDO AIR/WATER/SUCTION AND BIOPSY VALVE

## (undated) DEVICE — V2 SPECIMEN COLLECTION MANIFOLD KIT: Brand: NEPTUNE

## (undated) DEVICE — GIJAW SINGLE-USE BIOPSY FORCEPS WITH NEEDLE: Brand: GIJAW

## (undated) DEVICE — MEDI-VAC NON-CONDUCTIVE SUCTION TUBING 6MM X 1.8M (6FT.) L: Brand: CARDINAL HEALTH

## (undated) DEVICE — LINE MNTR ADLT SET O2 INTMD

## (undated) DEVICE — 6 ML SYRINGE LUER-LOCK TIP: Brand: MONOJECT

## (undated) DEVICE — SNARE CAPTIFLEX MICRO-OVL OLY

## (undated) DEVICE — FORCEP RADIAL JAW 4

## (undated) DEVICE — 60 ML SYRINGE REGULAR TIP: Brand: MONOJECT

## (undated) DEVICE — DRAPE SUR W53XL77IN STD POLYPR SMS 3 QTR SHT

## (undated) DEVICE — SUT PERMA- 2-0 30IN SH NABSRB BLK L26MM 1/

## (undated) DEVICE — Device: Brand: CUSTOM PROCEDURE KIT

## (undated) DEVICE — KIT CLEAN ENDOKIT 1.1OZ GOWNX2

## (undated) DEVICE — 35 ML SYRINGE REGULAR TIP: Brand: MONOJECT

## (undated) DEVICE — PACK CDS MINOR GENERAL

## (undated) DEVICE — 3 ML SYRINGE LUER-LOCK TIP: Brand: MONOJECT

## (undated) DEVICE — SOLUTION IRRIG 1000ML 0.9% NACL USP BTL

## (undated) NOTE — MR AVS SNAPSHOT
[facility] [place] [address] [address]  [address]  [phone]               Thank you for choosing us for your health care visit with Little Gilford, MD.  We are glad to serve you and happy to provide you with this summary of Don’t eat while when you’re bored.      EAT THESE FOODS MORE OFTEN: EAT THESE FOODS LESS OFTEN:   Make half your plate fruits and vegetables Highly refined, white starches including white bread, rice and pasta   Eat plenty of protein, keep the fat content l

## (undated) NOTE — LETTER
[facility]  Administration of Anesthesia  I Pierce Smith agree to be cared for by a physician anesthesiologist alone and/or with a nurse anesthetist, who is specially trained to monitor me and give me medicine to put me to sleep or keep me comfortable during my procedure    I understand that my anesthesiologist and/or anesthetist is not an employee or agent of [facility] or [employer]. He or she works for [facility]    As the patient asking for anesthesia services, I agree to:  Allow the anesthesiologist (anesthesia doctor) to give me medicine and do additional procedures as necessary. Some examples are: Starting or using an “IV” to give me medicine, fluids or blood during my procedure, and having a breathing tube placed to help me breathe when I’m asleep (intubation). In the event that my heart stops working properly, I understand that my anesthesiologist will make every effort to sustain my life, unless otherwise directed by [facility] Do Not Resuscitate documents.  Tell my anesthesia doctor before my procedure:  If I am pregnant.  The last time that I ate or drank.  iii. All of the medicines I take (including prescriptions, herbal supplements, and pills I can buy without a prescription (including street drugs/illegal medications). Failure to inform my anesthesiologist about these medicines may increase my risk of anesthetic complications.  iv.If I am allergic to anything or have had a reaction to anesthesia before.  I understand how the anesthesia medicine will help me (benefits).  I understand that with any type of anesthesia medicine there are risks:  The most common risks are: nausea, vomiting, sore throat, muscle soreness, damage to my eyes, mouth, or teeth (from breathing tube placement).  Rare risks include: remembering what happened during my procedure, allergic reactions to medications, injury to my airway, heart, lungs, vision, nerves, or  muscles and in extremely rare instances death.  My doctor has explained to me other choices available to me for my care (alternatives).  Pregnant Patients (“epidural”):  I understand that the risks of having an epidural (medicine given into my back to help control pain during labor), include itching, low blood pressure, difficulty urinating, headache or slowing of the baby’s heart. Very rare risks include infection, bleeding, seizure, irregular heart rhythms and nerve injury.  Regional Anesthesia (“spinal”, “epidural”, & “nerve blocks”):  I understand that rare but potential complications include headache, bleeding, infection, seizure, irregular heart rhythms, and nerve injury.    _____________________________________________________________________________  Patient (or Representative) Signature/Relationship to Patient  Date   Time    _____________________________________________________________________________   Name (if used)    Language/Organization   Time    _____________________________________________________________________________  Nurse Anesthetist Signature     Date   Time  _____________________________________________________________________________  Anesthesiologist Signature     Date   Time  I have discussed the procedure and information above with the patient (or patient’s representative) and answered their questions. The patient or their representative has agreed to have anesthesia services.    _____________________________________________________________________________  Witness        Date   Time  I have verified that the signature is that of the patient or patient’s representative, and that it was signed before the procedure  Patient Name: Pierce Smith     : 10/9/1965                 Printed: 5/15/2025 at 8:18 AM    Medical Record #: [id number]                                            Page 1 of 1  ----------ANESTHESIA CONSENT----------

## (undated) NOTE — LETTER
5/29/2024    Pierce Smith        [address]        [place] [address]            Dear Pierce Smith,      Our records indicate that you are due for an appointment for a Colonoscopy with Jose Gerard MD. Our doctors are booking out about 3-6 months in advance for procedures.     Please call our office to schedule a phone screening appointment to plan for the procedure(s).   Your medical well-being is important to us.    If your insurance requires a referral, please call your primary care office to request one.      Thank you,      The Physicians and Staff at [facility]

## (undated) NOTE — LETTER
05/21/18        Gregory Moreno  [address] [facility] [place] [place] [address]      Dear Lis Waterman,    [facility] records indicate that you have outstanding lab work and or testing that was ordered for you and has not yet been completed:          CBC With Diff      CMP

## (undated) NOTE — LETTER
7/2/2024          Pierce Smith        [address] [facility] [address]         Dear Pierce,    This letter is to inform you that our office has made several attempts to reach you by phone without success.  We were attempting to contact you by phone regarding your procedure.    Please contact our office at the number listed below as soon as you receive this letter to discuss this issue and to make the necessary changes in our system to your contact information.  Thank you for your cooperation.        Sincerely,    Jose Gerard MD  [facility], [place]  [address]  [facility] [id number] [phone]

## (undated) NOTE — LETTER
02/26/21        Frances McGuffey  [address]  [place] [address]      Dear Lakeshia Ventura,    [address] records indicate that you have outstanding lab work and or testing that was ordered for you and has not yet been completed.     To provide you with the best pos